# Patient Record
Sex: FEMALE | Race: WHITE | NOT HISPANIC OR LATINO | Employment: OTHER | URBAN - NONMETROPOLITAN AREA
[De-identification: names, ages, dates, MRNs, and addresses within clinical notes are randomized per-mention and may not be internally consistent; named-entity substitution may affect disease eponyms.]

---

## 2017-01-30 PROBLEM — M70.60 TROCHANTERIC BURSITIS: Status: ACTIVE | Noted: 2017-01-30

## 2017-01-30 PROBLEM — C44.90 MALIGNANT NEOPLASM OF SKIN: Status: ACTIVE | Noted: 2017-01-30

## 2017-01-30 PROBLEM — M48.061 SPINAL STENOSIS OF LUMBAR REGION: Status: ACTIVE | Noted: 2017-01-30

## 2017-01-30 PROBLEM — K21.9 GERD (GASTROESOPHAGEAL REFLUX DISEASE): Status: ACTIVE | Noted: 2017-01-30

## 2017-01-30 PROBLEM — R01.1 MURMUR: Status: ACTIVE | Noted: 2017-01-30

## 2017-01-30 PROBLEM — I25.10 CORONARY ARTERIOSCLEROSIS: Status: ACTIVE | Noted: 2017-01-30

## 2017-01-30 PROBLEM — N19 PRERENAL RENAL FAILURE: Status: ACTIVE | Noted: 2017-01-30

## 2017-01-30 PROBLEM — M48.50XA COMPRESSION FRACTURE OF VERTEBRAL COLUMN (HCC): Status: ACTIVE | Noted: 2017-01-30

## 2017-01-30 PROBLEM — S22.000A COMPRESSION FRACTURE OF THORACIC VERTEBRA (HCC): Status: ACTIVE | Noted: 2017-01-30

## 2017-01-30 PROBLEM — I50.32 CHRONIC DIASTOLIC CHF (CONGESTIVE HEART FAILURE) (HCC): Status: ACTIVE | Noted: 2017-01-30

## 2017-01-30 PROBLEM — I51.89 DIASTOLIC DYSFUNCTION: Status: ACTIVE | Noted: 2017-01-30

## 2017-01-30 PROBLEM — E78.5 HYPERLIPIDEMIA: Status: ACTIVE | Noted: 2017-01-30

## 2017-01-30 PROBLEM — R13.10 DYSPHAGIA: Status: ACTIVE | Noted: 2017-01-30

## 2017-01-30 PROBLEM — M19.90 CHRONIC OSTEOARTHRITIS: Status: ACTIVE | Noted: 2017-01-30

## 2017-01-30 PROBLEM — I10 ESSENTIAL HYPERTENSION: Status: ACTIVE | Noted: 2017-01-30

## 2017-01-30 PROBLEM — J84.10 DIFFUSE INTERSTITIAL PULMONARY FIBROSIS (HCC): Status: ACTIVE | Noted: 2017-01-30

## 2017-01-30 PROBLEM — R06.00 DYSPNEA: Status: ACTIVE | Noted: 2017-01-30

## 2017-01-30 PROBLEM — J84.10 FIBROSIS OF LUNG (HCC): Status: ACTIVE | Noted: 2017-01-30

## 2017-01-30 PROBLEM — M54.50 LOW BACK PAIN: Status: ACTIVE | Noted: 2017-01-30

## 2017-01-30 RX ORDER — PRAVASTATIN SODIUM 10 MG
10 TABLET ORAL DAILY
COMMUNITY

## 2017-01-30 RX ORDER — GABAPENTIN 400 MG/1
400 CAPSULE ORAL NIGHTLY
COMMUNITY

## 2017-01-30 RX ORDER — RANITIDINE 300 MG/1
300 TABLET ORAL 2 TIMES DAILY
COMMUNITY
End: 2021-06-24 | Stop reason: ALTCHOICE

## 2017-01-30 RX ORDER — PANTOPRAZOLE SODIUM 40 MG/1
40 TABLET, DELAYED RELEASE ORAL 2 TIMES DAILY
COMMUNITY
End: 2021-06-24 | Stop reason: ALTCHOICE

## 2017-01-30 RX ORDER — FUROSEMIDE 20 MG/1
20 TABLET ORAL 2 TIMES DAILY
COMMUNITY

## 2017-01-30 RX ORDER — MELOXICAM 15 MG/1
15 TABLET ORAL DAILY
COMMUNITY
End: 2019-01-16 | Stop reason: HOSPADM

## 2017-01-30 RX ORDER — SUCRALFATE 1 G/1
1 TABLET ORAL 4 TIMES DAILY
COMMUNITY

## 2017-01-30 RX ORDER — POTASSIUM CHLORIDE 20 MEQ/1
20 TABLET, EXTENDED RELEASE ORAL DAILY
COMMUNITY

## 2017-01-30 RX ORDER — SODIUM BICARBONATE 650 MG/1
650 TABLET ORAL 2 TIMES DAILY
COMMUNITY

## 2017-01-30 RX ORDER — TRAZODONE HYDROCHLORIDE 100 MG/1
100 TABLET ORAL NIGHTLY
COMMUNITY

## 2017-01-30 RX ORDER — CLOPIDOGREL BISULFATE 75 MG/1
75 TABLET ORAL DAILY
COMMUNITY

## 2017-01-30 RX ORDER — BETHANECHOL CHLORIDE 25 MG/1
25 TABLET ORAL 2 TIMES DAILY
COMMUNITY

## 2017-01-30 RX ORDER — DILTIAZEM HYDROCHLORIDE 120 MG/1
120 CAPSULE, COATED, EXTENDED RELEASE ORAL DAILY
Status: ON HOLD | COMMUNITY
End: 2019-01-15 | Stop reason: ALTCHOICE

## 2017-01-30 RX ORDER — NITROGLYCERIN 400 UG/1
1 SPRAY ORAL
COMMUNITY
End: 2017-04-24

## 2017-01-30 RX ORDER — KETOCONAZOLE 20 MG/ML
SHAMPOO TOPICAL 2 TIMES WEEKLY
COMMUNITY
End: 2017-04-24

## 2017-01-30 RX ORDER — LOVASTATIN 20 MG/1
20 TABLET ORAL NIGHTLY
COMMUNITY
End: 2017-01-31

## 2017-01-31 ENCOUNTER — OFFICE VISIT (OUTPATIENT)
Dept: CARDIOLOGY | Facility: CLINIC | Age: 82
End: 2017-01-31

## 2017-01-31 VITALS
HEART RATE: 56 BPM | DIASTOLIC BLOOD PRESSURE: 64 MMHG | WEIGHT: 135 LBS | SYSTOLIC BLOOD PRESSURE: 92 MMHG | BODY MASS INDEX: 24.84 KG/M2 | HEIGHT: 62 IN

## 2017-01-31 DIAGNOSIS — I25.10 CAD IN NATIVE ARTERY: Primary | ICD-10-CM

## 2017-01-31 DIAGNOSIS — I50.32 CHRONIC DIASTOLIC CHF (CONGESTIVE HEART FAILURE) (HCC): ICD-10-CM

## 2017-01-31 DIAGNOSIS — I10 ESSENTIAL HYPERTENSION: ICD-10-CM

## 2017-01-31 DIAGNOSIS — E78.2 MIXED HYPERLIPIDEMIA: ICD-10-CM

## 2017-01-31 PROCEDURE — 99203 OFFICE O/P NEW LOW 30 MIN: CPT | Performed by: INTERNAL MEDICINE

## 2017-01-31 PROCEDURE — 93000 ELECTROCARDIOGRAM COMPLETE: CPT | Performed by: INTERNAL MEDICINE

## 2017-01-31 RX ORDER — ACETAMINOPHEN 325 MG/1
325 TABLET ORAL EVERY 6 HOURS PRN
COMMUNITY

## 2017-01-31 NOTE — MR AVS SNAPSHOT
Carolina Mari   1/31/2017 12:30 PM   Office Visit    Dept Phone:  [phone]   Encounter #:  [id number]    Provider:  Walker Hunter MD   Department:  [facility]                Your Full Care Plan              Today's Medication Changes          These changes are accurate as of: 1/31/17  1:38 PM.  If you have any questions, ask your nurse or doctor.               Stop taking medication(s)listed here:     lovastatin 20 MG tablet   Commonly known as:  MEVACOR   Stopped by:  Walker Hunter MD                      Your Updated Medication List          This list is accurate as of: 1/31/17  1:38 PM.  Always use your most recent med list.                bethanechol 25 MG tablet   Commonly known as:  URECHOLINE       CALCIUM 500 + D PO       clopidogrel 75 MG tablet   Commonly known as:  PLAVIX       diltiaZEM  MG 24 hr capsule   Commonly known as:  CARDIZEM CD       furosemide 40 MG tablet   Commonly known as:  LASIX       gabapentin 300 MG capsule   Commonly known as:  NEURONTIN       ketoconazole 2 % shampoo   Commonly known as:  NIZORAL       meloxicam 15 MG tablet   Commonly known as:  MOBIC       nitroglycerin 0.4 MG/SPRAY spray   Commonly known as:  NITROLINGUAL       pantoprazole 40 MG EC tablet   Commonly known as:  PROTONIX       potassium chloride 20 MEQ CR tablet   Commonly known as:  K-DUR,KLOR-CON       pravastatin 20 MG tablet   Commonly known as:  PRAVACHOL       raNITIdine 300 MG tablet   Commonly known as:  ZANTAC       sodium bicarbonate 650 MG tablet       sucralfate 1 G tablet   Commonly known as:  CARAFATE       traZODone 100 MG tablet   Commonly known as:  DESYREL       TYLENOL 325 MG tablet   Generic drug:  acetaminophen               We Performed the Following     ECG 12 Lead       You Were Diagnosed With        Codes Comments    CAD in native artery    -  Primary ICD-10-CM: I25.10  ICD-9-CM: 414.01     Essential  hypertension     ICD-10-CM: I10  ICD-9-CM: 401.9     Mixed hyperlipidemia     ICD-10-CM: E78.2  ICD-9-CM: 272.2     Chronic diastolic CHF (congestive heart failure)     ICD-10-CM: I50.32  ICD-9-CM: 428.32, 428.0       Instructions     None    Patient Instructions History      Upcoming Appointments     Visit Type Date Time Department    NEW PATIENT 2017 12:30 PM [facility] PAD    NEW PATIENT 3/13/2017  9:15 AM [facility]    FOLLOW UP 2018 10:15 AM [facility] PAD      MyChart Signup     [facility] [employer] allows you to send messages to your doctor, view your test results, renew your prescriptions, schedule appointments, and more. To sign up, go to [employer] and click on the Sign Up Now link in the New User? box. Enter your [employer] Activation Code exactly as it appears below along with the last four digits of your Social Security Number and your Date of Birth () to complete the sign-up process. If you do not sign up before the expiration date, you must request a new code.    [employer] Activation Code: [email or web address]  Expires: 2017  1:37 PM    If you have questions, you can email [email or web address] or call [phone] to talk to our [employer] staff. Remember, "[employer]t is NOT to be used for urgent needs. For medical emergencies, dial 911.               Other Info from Your Visit           Your Appointments     Mar 13, 2017  9:15 AM CDT   New Patient with AUSTIN Lee   [facility] (--)    [address] [place] [address] [address]  [place] [address] [phone]           Bring all previous medical records and films, along with current medications and insurance information.            2018 10:15 AM CST   Follow Up with Walker Hunter MD   [facility] (--)    [address]  [place] [address] [phone]           Arrive 15 minutes prior to appointment.              Allergies      "Aspirin      Codeine      Contrast Dye      Iodine      Lisinopril      Penicillins        Reason for Visit     Congestive Heart Failure     Coronary Artery Disease     Hypertension     Hyperlipidemia           Vital Signs     Blood Pressure Pulse Height Weight Body Mass Index Smoking Status    92/64 (BP Location: Left arm, Patient Position: Sitting) 56 62\" (157.5 cm) 135 lb (61.2 kg) 24.69 kg/m2 Never Smoker      Problems and Diagnoses Noted     Heart disease due to blocked artery    Heart failure    High blood pressure    High cholesterol or triglycerides      Results     ECG 12 Lead               "
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
## 2017-04-24 ENCOUNTER — PROCEDURE VISIT (OUTPATIENT)
Dept: OTOLARYNGOLOGY | Facility: CLINIC | Age: 82
End: 2017-04-24

## 2017-04-24 ENCOUNTER — OFFICE VISIT (OUTPATIENT)
Dept: OTOLARYNGOLOGY | Facility: CLINIC | Age: 82
End: 2017-04-24

## 2017-04-24 VITALS
WEIGHT: 135 LBS | TEMPERATURE: 98.2 F | SYSTOLIC BLOOD PRESSURE: 128 MMHG | BODY MASS INDEX: 24.84 KG/M2 | HEIGHT: 62 IN | HEART RATE: 75 BPM | DIASTOLIC BLOOD PRESSURE: 85 MMHG

## 2017-04-24 DIAGNOSIS — R49.8 VOCAL FATIGUE: ICD-10-CM

## 2017-04-24 DIAGNOSIS — H93.13 TINNITUS, BILATERAL: ICD-10-CM

## 2017-04-24 DIAGNOSIS — R49.9 VOICE DISORDER: Primary | ICD-10-CM

## 2017-04-24 DIAGNOSIS — K21.9 GASTROESOPHAGEAL REFLUX DISEASE WITHOUT ESOPHAGITIS: ICD-10-CM

## 2017-04-24 DIAGNOSIS — H90.5 HEARING LOSS, SENSORINEURAL: Primary | ICD-10-CM

## 2017-04-24 PROCEDURE — 99203 OFFICE O/P NEW LOW 30 MIN: CPT | Performed by: NURSE PRACTITIONER

## 2017-04-24 PROCEDURE — 31575 DIAGNOSTIC LARYNGOSCOPY: CPT | Performed by: NURSE PRACTITIONER

## 2017-04-24 NOTE — PROGRESS NOTES
OPERATIVE NOTE:    PROCEDURE:   Flexible Fiberoptic Laryngoscopy    ANESTHESIA:  None    REASON FOR PROCEDURE:  Procedure was recommend for suspicious clinical behavior unresponsive to medical management.  Risks, benefits and alternatives were discussed.      DETAILS of OPERATION:  The patient was seated in the exam chair.  A flexible fiberoptic laryngoscopy was performed through the oral cavity.  The scope was introduced into the oral cavity and directed to the level of the glottis, examining the structures of the oropharynx, base of tongue, vallecula, supraglottic larynx, glottic larynx, and hypopharynx.      FINDINGS:  Mucosal surfaces:   The mucosal surfaces demonstrated normal mucosa surfaces without inflammation.    Base of tongue:  The base of tongue was found to have no mass or lesion.    Epiglottis:  The epiglottis was found to have no mass or lesion.    Aryepligottic fold:  The AE folds were found to have no mass or lesion.    False Vocal Fold:  The false cords were found to have no mass or lesion.    True Vocal Cord:  The true vocal cords were found to have no mass or lesion.    Arytenoid:   The arytenoids were found to have no mass or lesions.    Hypopharynx:  The hypopharynx was found to have no mass or lesion.    The patient tolerated procedure well.
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
## 2017-04-24 NOTE — PATIENT INSTRUCTIONS
(1) Due to the hearing loss and tinnitus, hearing aids were recommended. The patient did not want to pursue at this time.  (2) Tinnitus management strategies were discussed.

## 2017-04-24 NOTE — PATIENT INSTRUCTIONS
Gastroesophageal Reflux Disease, Adult  Normally, food travels down the esophagus and stays in the stomach to be digested. However, when a person has gastroesophageal reflux disease (GERD), food and stomach acid move back up into the esophagus. When this happens, the esophagus becomes sore and inflamed. Over time, GERD can create small holes (ulcers) in the lining of the esophagus.   CAUSES  This condition is caused by a problem with the muscle between the esophagus and the stomach (lower esophageal sphincter, or LES). Normally, the LES muscle closes after food passes through the esophagus to the stomach. When the LES is weakened or abnormal, it does not close properly, and that allows food and stomach acid to go back up into the esophagus. The LES can be weakened by certain dietary substances, medicines, and medical conditions, including:  · Tobacco use.  · Pregnancy.  · Having a hiatal hernia.  · Heavy alcohol use.  · Certain foods and beverages, such as coffee, chocolate, onions, and peppermint.  RISK FACTORS  This condition is more likely to develop in:  · People who have an increased body weight.  · People who have connective tissue disorders.  · People who use NSAID medicines.  SYMPTOMS  Symptoms of this condition include:  · Heartburn.  · Difficult or painful swallowing.  · The feeling of having a lump in the throat.  · A bitter taste in the mouth.  · Bad breath.  · Having a large amount of saliva.  · Having an upset or bloated stomach.  · Belching.  · Chest pain.  · Shortness of breath or wheezing.  · Ongoing (chronic) cough or a night-time cough.  · Wearing away of tooth enamel.  · Weight loss.  Different conditions can cause chest pain. Make sure to see your health care provider if you experience chest pain.  DIAGNOSIS  Your health care provider will take a medical history and perform a physical exam. To determine if you have mild or severe GERD, your health care provider may also monitor how you respond  to treatment. You may also have other tests, including:  · An endoscopy to examine your stomach and esophagus with a small camera.  · A test that measures the acidity level in your esophagus.  · A test that measures how much pressure is on your esophagus.  · A barium swallow or modified barium swallow to show the shape, size, and functioning of your esophagus.  TREATMENT  The goal of treatment is to help relieve your symptoms and to prevent complications. Treatment for this condition may vary depending on how severe your symptoms are. Your health care provider may recommend:  · Changes to your diet.  · Medicine.  · Surgery.  HOME CARE INSTRUCTIONS  Diet  · Follow a diet as recommended by your health care provider. This may involve avoiding foods and drinks such as:    Coffee and tea (with or without caffeine).    Drinks that contain alcohol.    Energy drinks and sports drinks.    Carbonated drinks or sodas.    Chocolate and cocoa.    Peppermint and mint flavorings.    Garlic and onions.    Horseradish.    Spicy and acidic foods, including peppers, chili powder, correia powder, vinegar, hot sauces, and barbecue sauce.    Citrus fruit juices and citrus fruits, such as oranges, farhana, and limes.    Tomato-based foods, such as red sauce, chili, salsa, and pizza with red sauce.    Fried and fatty foods, such as donuts, french fries, potato chips, and high-fat dressings.    High-fat meats, such as hot dogs and fatty cuts of red and white meats, such as rib eye steak, sausage, ham, and mills.    High-fat dairy items, such as whole milk, butter, and cream cheese.  · Eat small, frequent meals instead of large meals.  · Avoid drinking large amounts of liquid with your meals.  · Avoid eating meals during the 2-3 hours before bedtime.  · Avoid lying down right after you eat.  · Do not exercise right after you eat.   General Instructions   · Pay attention to any changes in your symptoms.  · Take over-the-counter and prescription  medicines only as told by your health care provider. Do not take aspirin, ibuprofen, or other NSAIDs unless your health care provider told you to do so.  · Do not use any tobacco products, including cigarettes, chewing tobacco, and e-cigarettes. If you need help quitting, ask your health care provider.  · Wear loose-fitting clothing. Do not wear anything tight around your waist that causes pressure on your abdomen.  · Raise (elevate) the head of your bed 6 inches (15cm).  · Try to reduce your stress, such as with yoga or meditation. If you need help reducing stress, ask your health care provider.  · If you are overweight, reduce your weight to an amount that is healthy for you. Ask your health care provider for guidance about a safe weight loss goal.  · Keep all follow-up visits as told by your health care provider. This is important.  SEEK MEDICAL CARE IF:  · You have new symptoms.  · You have unexplained weight loss.  · You have difficulty swallowing, or it hurts to swallow.  · You have wheezing or a persistent cough.  · Your symptoms do not improve with treatment.  · You have a hoarse voice.  SEEK IMMEDIATE MEDICAL CARE IF:  · You have pain in your arms, neck, jaw, teeth, or back.  · You feel sweaty, dizzy, or light-headed.  · You have chest pain or shortness of breath.  · You vomit and your vomit looks like blood or coffee grounds.  · You faint.  · Your stool is bloody or black.  · You cannot swallow, drink, or eat.     This information is not intended to replace advice given to you by your health care provider. Make sure you discuss any questions you have with your health care provider.     Document Released: 09/27/2006 Document Revised: 09/07/2016 Document Reviewed: 04/13/2016  [employer] Interactive Patient Education ©2016 [employer]

## 2018-11-11 ENCOUNTER — OUTSIDE FACILITY SERVICE (OUTPATIENT)
Dept: PULMONOLOGY | Facility: CLINIC | Age: 83
End: 2018-11-11

## 2018-11-11 PROCEDURE — 99232 SBSQ HOSP IP/OBS MODERATE 35: CPT | Performed by: INTERNAL MEDICINE

## 2018-11-12 ENCOUNTER — OUTSIDE FACILITY SERVICE (OUTPATIENT)
Dept: PULMONOLOGY | Facility: CLINIC | Age: 83
End: 2018-11-12

## 2018-11-12 PROCEDURE — 99232 SBSQ HOSP IP/OBS MODERATE 35: CPT | Performed by: INTERNAL MEDICINE

## 2018-11-13 ENCOUNTER — OUTSIDE FACILITY SERVICE (OUTPATIENT)
Dept: PULMONOLOGY | Facility: CLINIC | Age: 83
End: 2018-11-13

## 2018-11-13 PROCEDURE — 99232 SBSQ HOSP IP/OBS MODERATE 35: CPT | Performed by: INTERNAL MEDICINE

## 2018-11-14 ENCOUNTER — OUTSIDE FACILITY SERVICE (OUTPATIENT)
Dept: PULMONOLOGY | Facility: CLINIC | Age: 83
End: 2018-11-14

## 2018-11-14 PROCEDURE — 99231 SBSQ HOSP IP/OBS SF/LOW 25: CPT | Performed by: INTERNAL MEDICINE

## 2019-01-08 ENCOUNTER — OUTSIDE FACILITY SERVICE (OUTPATIENT)
Dept: CARDIOLOGY | Facility: CLINIC | Age: 84
End: 2019-01-08

## 2019-01-08 PROCEDURE — 93306 TTE W/DOPPLER COMPLETE: CPT | Performed by: INTERNAL MEDICINE

## 2019-01-15 ENCOUNTER — APPOINTMENT (OUTPATIENT)
Dept: GENERAL RADIOLOGY | Facility: HOSPITAL | Age: 84
End: 2019-01-15

## 2019-01-15 ENCOUNTER — APPOINTMENT (OUTPATIENT)
Dept: CT IMAGING | Facility: HOSPITAL | Age: 84
End: 2019-01-15

## 2019-01-15 ENCOUNTER — HOSPITAL ENCOUNTER (INPATIENT)
Facility: HOSPITAL | Age: 84
LOS: 1 days | Discharge: HOME HEALTH | End: 2019-01-16
Attending: EMERGENCY MEDICINE | Admitting: FAMILY MEDICINE

## 2019-01-15 DIAGNOSIS — J18.9 PNEUMONIA DUE TO INFECTIOUS ORGANISM, UNSPECIFIED LATERALITY, UNSPECIFIED PART OF LUNG: Primary | ICD-10-CM

## 2019-01-15 DIAGNOSIS — R79.89 POSITIVE D DIMER: ICD-10-CM

## 2019-01-15 DIAGNOSIS — N17.9 AKI (ACUTE KIDNEY INJURY) (HCC): ICD-10-CM

## 2019-01-15 PROBLEM — R00.1 BRADYCARDIA: Status: ACTIVE | Noted: 2019-01-15

## 2019-01-15 PROBLEM — I95.9 SYMPTOMATIC HYPOTENSION: Status: ACTIVE | Noted: 2019-01-15

## 2019-01-15 LAB
ALBUMIN SERPL-MCNC: 4.2 G/DL (ref 3.5–5)
ALBUMIN/GLOB SERPL: 1.4 G/DL (ref 1.1–2.5)
ALP SERPL-CCNC: 66 U/L (ref 24–120)
ALT SERPL W P-5'-P-CCNC: 19 U/L (ref 0–54)
ANION GAP SERPL CALCULATED.3IONS-SCNC: 11 MMOL/L (ref 4–13)
ARTERIAL PATENCY WRIST A: POSITIVE
AST SERPL-CCNC: 30 U/L (ref 7–45)
ATMOSPHERIC PRESS: 760 MMHG
BASE EXCESS BLDA CALC-SCNC: -2.9 MMOL/L (ref 0–2)
BASOPHILS # BLD AUTO: 0.07 10*3/MM3 (ref 0–0.2)
BASOPHILS NFR BLD AUTO: 1.1 % (ref 0–2)
BDY SITE: ABNORMAL
BILIRUB SERPL-MCNC: 0.4 MG/DL (ref 0.1–1)
BODY TEMPERATURE: 37 C
BUN BLD-MCNC: 39 MG/DL (ref 5–21)
BUN/CREAT SERPL: 19.8 (ref 7–25)
CALCIUM SPEC-SCNC: 9.5 MG/DL (ref 8.4–10.4)
CHLORIDE SERPL-SCNC: 107 MMOL/L (ref 98–110)
CO2 SERPL-SCNC: 24 MMOL/L (ref 24–31)
CREAT BLD-MCNC: 1.97 MG/DL (ref 0.5–1.4)
D DIMER PPP FEU-MCNC: 1.38 MG/L (FEU) (ref 0–0.5)
D-LACTATE SERPL-SCNC: 1.6 MMOL/L (ref 0.5–2)
DEPRECATED RDW RBC AUTO: 50.8 FL (ref 40–54)
EOSINOPHIL # BLD AUTO: 0.19 10*3/MM3 (ref 0–0.7)
EOSINOPHIL NFR BLD AUTO: 2.9 % (ref 0–4)
ERYTHROCYTE [DISTWIDTH] IN BLOOD BY AUTOMATED COUNT: 14.6 % (ref 12–15)
GFR SERPL CREATININE-BSD FRML MDRD: 24 ML/MIN/1.73
GLOBULIN UR ELPH-MCNC: 2.9 GM/DL
GLUCOSE BLD-MCNC: 84 MG/DL (ref 70–100)
HCO3 BLDA-SCNC: 22.2 MMOL/L (ref 20–26)
HCT VFR BLD AUTO: 33.5 % (ref 37–47)
HGB BLD-MCNC: 10.3 G/DL (ref 12–16)
IMM GRANULOCYTES # BLD AUTO: 0.03 10*3/MM3 (ref 0–0.03)
IMM GRANULOCYTES NFR BLD AUTO: 0.5 % (ref 0–5)
INR PPP: 0.9 (ref 0.91–1.09)
LYMPHOCYTES # BLD AUTO: 1.64 10*3/MM3 (ref 0.72–4.86)
LYMPHOCYTES NFR BLD AUTO: 24.7 % (ref 15–45)
Lab: ABNORMAL
MCH RBC QN AUTO: 28.9 PG (ref 28–32)
MCHC RBC AUTO-ENTMCNC: 30.7 G/DL (ref 33–36)
MCV RBC AUTO: 93.8 FL (ref 82–98)
MODALITY: ABNORMAL
MONOCYTES # BLD AUTO: 0.44 10*3/MM3 (ref 0.19–1.3)
MONOCYTES NFR BLD AUTO: 6.6 % (ref 4–12)
NEUTROPHILS # BLD AUTO: 4.27 10*3/MM3 (ref 1.87–8.4)
NEUTROPHILS NFR BLD AUTO: 64.2 % (ref 39–78)
NRBC BLD AUTO-RTO: 0 /100 WBC (ref 0–0)
NT-PROBNP SERPL-MCNC: 550 PG/ML (ref 0–1800)
PCO2 BLDA: 38.6 MM HG (ref 35–45)
PH BLDA: 7.37 PH UNITS (ref 7.35–7.45)
PLATELET # BLD AUTO: 310 10*3/MM3 (ref 130–400)
PMV BLD AUTO: 10.4 FL (ref 6–12)
PO2 BLDA: 71.6 MM HG (ref 83–108)
POTASSIUM BLD-SCNC: 5.4 MMOL/L (ref 3.5–5.3)
PROT SERPL-MCNC: 7.1 G/DL (ref 6.3–8.7)
PROTHROMBIN TIME: 12.4 SECONDS (ref 11.9–14.6)
RBC # BLD AUTO: 3.57 10*6/MM3 (ref 4.2–5.4)
SAO2 % BLDCOA: 94.1 % (ref 94–99)
SODIUM BLD-SCNC: 142 MMOL/L (ref 135–145)
TROPONIN I SERPL-MCNC: <0.012 NG/ML (ref 0–0.03)
VENTILATOR MODE: ABNORMAL
WBC NRBC COR # BLD: 6.64 10*3/MM3 (ref 4.8–10.8)

## 2019-01-15 PROCEDURE — 87040 BLOOD CULTURE FOR BACTERIA: CPT | Performed by: EMERGENCY MEDICINE

## 2019-01-15 PROCEDURE — 85610 PROTHROMBIN TIME: CPT | Performed by: EMERGENCY MEDICINE

## 2019-01-15 PROCEDURE — 25010000002 CEFTRIAXONE PER 250 MG: Performed by: NURSE PRACTITIONER

## 2019-01-15 PROCEDURE — 82803 BLOOD GASES ANY COMBINATION: CPT

## 2019-01-15 PROCEDURE — 71045 X-RAY EXAM CHEST 1 VIEW: CPT

## 2019-01-15 PROCEDURE — 83880 ASSAY OF NATRIURETIC PEPTIDE: CPT | Performed by: EMERGENCY MEDICINE

## 2019-01-15 PROCEDURE — 71046 X-RAY EXAM CHEST 2 VIEWS: CPT

## 2019-01-15 PROCEDURE — 94799 UNLISTED PULMONARY SVC/PX: CPT

## 2019-01-15 PROCEDURE — 83605 ASSAY OF LACTIC ACID: CPT | Performed by: EMERGENCY MEDICINE

## 2019-01-15 PROCEDURE — 87150 DNA/RNA AMPLIFIED PROBE: CPT | Performed by: EMERGENCY MEDICINE

## 2019-01-15 PROCEDURE — 94760 N-INVAS EAR/PLS OXIMETRY 1: CPT

## 2019-01-15 PROCEDURE — 84484 ASSAY OF TROPONIN QUANT: CPT | Performed by: EMERGENCY MEDICINE

## 2019-01-15 PROCEDURE — 93005 ELECTROCARDIOGRAM TRACING: CPT | Performed by: EMERGENCY MEDICINE

## 2019-01-15 PROCEDURE — 85379 FIBRIN DEGRADATION QUANT: CPT | Performed by: EMERGENCY MEDICINE

## 2019-01-15 PROCEDURE — 25010000002 AZITHROMYCIN PER 500 MG: Performed by: NURSE PRACTITIONER

## 2019-01-15 PROCEDURE — 80053 COMPREHEN METABOLIC PANEL: CPT | Performed by: EMERGENCY MEDICINE

## 2019-01-15 PROCEDURE — 36415 COLL VENOUS BLD VENIPUNCTURE: CPT | Performed by: EMERGENCY MEDICINE

## 2019-01-15 PROCEDURE — 85025 COMPLETE CBC W/AUTO DIFF WBC: CPT | Performed by: EMERGENCY MEDICINE

## 2019-01-15 PROCEDURE — 94640 AIRWAY INHALATION TREATMENT: CPT

## 2019-01-15 PROCEDURE — 99284 EMERGENCY DEPT VISIT MOD MDM: CPT

## 2019-01-15 PROCEDURE — 25010000002 LEVOFLOXACIN PER 250 MG: Performed by: EMERGENCY MEDICINE

## 2019-01-15 PROCEDURE — 36600 WITHDRAWAL OF ARTERIAL BLOOD: CPT

## 2019-01-15 PROCEDURE — 25010000002 ENOXAPARIN PER 10 MG: Performed by: EMERGENCY MEDICINE

## 2019-01-15 PROCEDURE — 93010 ELECTROCARDIOGRAM REPORT: CPT | Performed by: INTERNAL MEDICINE

## 2019-01-15 PROCEDURE — 25010000002 FUROSEMIDE PER 20 MG: Performed by: EMERGENCY MEDICINE

## 2019-01-15 RX ORDER — ONDANSETRON 4 MG/1
4 TABLET, FILM COATED ORAL EVERY 6 HOURS PRN
Status: DISCONTINUED | OUTPATIENT
Start: 2019-01-15 | End: 2019-01-16 | Stop reason: HOSPADM

## 2019-01-15 RX ORDER — ACETAMINOPHEN 325 MG/1
325 TABLET ORAL EVERY 6 HOURS PRN
Status: DISCONTINUED | OUTPATIENT
Start: 2019-01-15 | End: 2019-01-15 | Stop reason: SDUPTHER

## 2019-01-15 RX ORDER — SUCRALFATE 1 G/1
1 TABLET ORAL 4 TIMES DAILY
Status: DISCONTINUED | OUTPATIENT
Start: 2019-01-15 | End: 2019-01-16 | Stop reason: HOSPADM

## 2019-01-15 RX ORDER — GUAIFENESIN 600 MG/1
1200 TABLET, EXTENDED RELEASE ORAL EVERY 12 HOURS SCHEDULED
Status: DISCONTINUED | OUTPATIENT
Start: 2019-01-15 | End: 2019-01-16 | Stop reason: HOSPADM

## 2019-01-15 RX ORDER — IPRATROPIUM BROMIDE AND ALBUTEROL SULFATE 2.5; .5 MG/3ML; MG/3ML
3 SOLUTION RESPIRATORY (INHALATION)
Status: DISCONTINUED | OUTPATIENT
Start: 2019-01-15 | End: 2019-01-16 | Stop reason: HOSPADM

## 2019-01-15 RX ORDER — LEVOFLOXACIN 5 MG/ML
500 INJECTION, SOLUTION INTRAVENOUS ONCE
Status: COMPLETED | OUTPATIENT
Start: 2019-01-15 | End: 2019-01-15

## 2019-01-15 RX ORDER — ONDANSETRON 4 MG/1
4 TABLET, ORALLY DISINTEGRATING ORAL EVERY 6 HOURS PRN
Status: DISCONTINUED | OUTPATIENT
Start: 2019-01-15 | End: 2019-01-16 | Stop reason: HOSPADM

## 2019-01-15 RX ORDER — CARVEDILOL 3.12 MG/1
3.12 TABLET ORAL 2 TIMES DAILY WITH MEALS
COMMUNITY

## 2019-01-15 RX ORDER — PANTOPRAZOLE SODIUM 40 MG/1
40 TABLET, DELAYED RELEASE ORAL
Status: DISCONTINUED | OUTPATIENT
Start: 2019-01-16 | End: 2019-01-16 | Stop reason: HOSPADM

## 2019-01-15 RX ORDER — NITROGLYCERIN 0.4 MG/1
0.4 TABLET SUBLINGUAL
Status: DISCONTINUED | OUTPATIENT
Start: 2019-01-15 | End: 2019-01-16 | Stop reason: HOSPADM

## 2019-01-15 RX ORDER — ACETAMINOPHEN 325 MG/1
650 TABLET ORAL EVERY 4 HOURS PRN
Status: DISCONTINUED | OUTPATIENT
Start: 2019-01-15 | End: 2019-01-16 | Stop reason: HOSPADM

## 2019-01-15 RX ORDER — TRAZODONE HYDROCHLORIDE 100 MG/1
100 TABLET ORAL NIGHTLY
Status: DISCONTINUED | OUTPATIENT
Start: 2019-01-15 | End: 2019-01-16 | Stop reason: HOSPADM

## 2019-01-15 RX ORDER — POTASSIUM CHLORIDE 750 MG/1
20 CAPSULE, EXTENDED RELEASE ORAL DAILY
Status: DISCONTINUED | OUTPATIENT
Start: 2019-01-16 | End: 2019-01-16 | Stop reason: HOSPADM

## 2019-01-15 RX ORDER — SODIUM BICARBONATE 650 MG/1
650 TABLET ORAL 2 TIMES DAILY
Status: DISCONTINUED | OUTPATIENT
Start: 2019-01-15 | End: 2019-01-16 | Stop reason: HOSPADM

## 2019-01-15 RX ORDER — ATORVASTATIN CALCIUM 10 MG/1
10 TABLET, FILM COATED ORAL DAILY
Status: DISCONTINUED | OUTPATIENT
Start: 2019-01-16 | End: 2019-01-16 | Stop reason: HOSPADM

## 2019-01-15 RX ORDER — SODIUM CHLORIDE 9 MG/ML
75 INJECTION, SOLUTION INTRAVENOUS CONTINUOUS
Status: DISCONTINUED | OUTPATIENT
Start: 2019-01-15 | End: 2019-01-16

## 2019-01-15 RX ORDER — SODIUM CHLORIDE 0.9 % (FLUSH) 0.9 %
3 SYRINGE (ML) INJECTION EVERY 12 HOURS SCHEDULED
Status: DISCONTINUED | OUTPATIENT
Start: 2019-01-15 | End: 2019-01-16 | Stop reason: HOSPADM

## 2019-01-15 RX ORDER — CARVEDILOL 6.25 MG/1
6.25 TABLET ORAL 2 TIMES DAILY WITH MEALS
Status: DISCONTINUED | OUTPATIENT
Start: 2019-01-15 | End: 2019-01-15

## 2019-01-15 RX ORDER — SODIUM CHLORIDE 0.9 % (FLUSH) 0.9 %
3-10 SYRINGE (ML) INJECTION AS NEEDED
Status: DISCONTINUED | OUTPATIENT
Start: 2019-01-15 | End: 2019-01-16 | Stop reason: HOSPADM

## 2019-01-15 RX ORDER — GABAPENTIN 400 MG/1
400 CAPSULE ORAL NIGHTLY
Status: DISCONTINUED | OUTPATIENT
Start: 2019-01-15 | End: 2019-01-16 | Stop reason: HOSPADM

## 2019-01-15 RX ORDER — CLOPIDOGREL BISULFATE 75 MG/1
75 TABLET ORAL DAILY
Status: DISCONTINUED | OUTPATIENT
Start: 2019-01-16 | End: 2019-01-16 | Stop reason: HOSPADM

## 2019-01-15 RX ORDER — CARVEDILOL 3.12 MG/1
3.12 TABLET ORAL 2 TIMES DAILY WITH MEALS
Status: DISCONTINUED | OUTPATIENT
Start: 2019-01-16 | End: 2019-01-16 | Stop reason: HOSPADM

## 2019-01-15 RX ORDER — FAMOTIDINE 20 MG/1
40 TABLET, FILM COATED ORAL 2 TIMES DAILY
Status: DISCONTINUED | OUTPATIENT
Start: 2019-01-15 | End: 2019-01-16 | Stop reason: HOSPADM

## 2019-01-15 RX ORDER — FUROSEMIDE 10 MG/ML
40 INJECTION INTRAMUSCULAR; INTRAVENOUS ONCE
Status: COMPLETED | OUTPATIENT
Start: 2019-01-15 | End: 2019-01-15

## 2019-01-15 RX ORDER — ONDANSETRON 2 MG/ML
4 INJECTION INTRAMUSCULAR; INTRAVENOUS EVERY 6 HOURS PRN
Status: DISCONTINUED | OUTPATIENT
Start: 2019-01-15 | End: 2019-01-16 | Stop reason: HOSPADM

## 2019-01-15 RX ORDER — FUROSEMIDE 20 MG/1
20 TABLET ORAL
Status: DISCONTINUED | OUTPATIENT
Start: 2019-01-16 | End: 2019-01-15

## 2019-01-15 RX ORDER — BETHANECHOL CHLORIDE 25 MG/1
25 TABLET ORAL 2 TIMES DAILY
Status: DISCONTINUED | OUTPATIENT
Start: 2019-01-15 | End: 2019-01-16 | Stop reason: HOSPADM

## 2019-01-15 RX ADMIN — AZITHROMYCIN MONOHYDRATE 500 MG: 500 INJECTION, POWDER, LYOPHILIZED, FOR SOLUTION INTRAVENOUS at 18:09

## 2019-01-15 RX ADMIN — SODIUM BICARBONATE 650 MG: 650 TABLET ORAL at 20:34

## 2019-01-15 RX ADMIN — LEVOFLOXACIN 500 MG: 5 INJECTION, SOLUTION INTRAVENOUS at 15:06

## 2019-01-15 RX ADMIN — TRAZODONE HYDROCHLORIDE 100 MG: 100 TABLET ORAL at 20:34

## 2019-01-15 RX ADMIN — FAMOTIDINE 40 MG: 20 TABLET, FILM COATED ORAL at 20:34

## 2019-01-15 RX ADMIN — IPRATROPIUM BROMIDE AND ALBUTEROL SULFATE 3 ML: 2.5; .5 SOLUTION RESPIRATORY (INHALATION) at 22:49

## 2019-01-15 RX ADMIN — IPRATROPIUM BROMIDE AND ALBUTEROL SULFATE 3 ML: 2.5; .5 SOLUTION RESPIRATORY (INHALATION) at 18:50

## 2019-01-15 RX ADMIN — GUAIFENESIN 1200 MG: 600 TABLET, EXTENDED RELEASE ORAL at 20:30

## 2019-01-15 RX ADMIN — ACETAMINOPHEN 650 MG: 325 TABLET, FILM COATED ORAL at 20:30

## 2019-01-15 RX ADMIN — FUROSEMIDE 40 MG: 10 INJECTION, SOLUTION INTRAMUSCULAR; INTRAVENOUS at 11:25

## 2019-01-15 RX ADMIN — ENOXAPARIN SODIUM 60 MG: 60 INJECTION SUBCUTANEOUS at 16:03

## 2019-01-15 RX ADMIN — BETHANECHOL CHLORIDE 25 MG: 25 TABLET ORAL at 20:34

## 2019-01-15 RX ADMIN — GABAPENTIN 400 MG: 400 CAPSULE ORAL at 20:34

## 2019-01-15 RX ADMIN — CARVEDILOL 6.25 MG: 6.25 TABLET, FILM COATED ORAL at 20:35

## 2019-01-15 RX ADMIN — SUCRALFATE 1 G: 1 TABLET ORAL at 20:34

## 2019-01-15 RX ADMIN — SODIUM CHLORIDE 75 ML/HR: 9 INJECTION, SOLUTION INTRAVENOUS at 17:39

## 2019-01-15 RX ADMIN — CEFTRIAXONE SODIUM 1 G: 1 INJECTION, POWDER, FOR SOLUTION INTRAMUSCULAR; INTRAVENOUS at 18:09

## 2019-01-15 NOTE — ED NOTES
CALLED [facility] AND HE STATED HE WOULD RECONCILE PT MEDICATIONS     Chantelle Banks, RN  01/15/19 4856

## 2019-01-15 NOTE — ED PROVIDER NOTES
Subjective     Shortness of Breath   Severity:  Moderate  Onset quality:  Gradual  Timing:  Constant  Progression:  Worsening  Chronicity:  Recurrent  Context: not activity, not animal exposure, not emotional upset, not pollens, not URI and not weather changes    Relieved by:  Nothing  Worsened by:  Nothing  Ineffective treatments:  None tried  Associated symptoms: cough, PND and wheezing    Associated symptoms: no abdominal pain, no chest pain, no claudication, no diaphoresis, no ear pain, no fever, no headaches, no hemoptysis, no neck pain, no rash, no sore throat, no sputum production, no syncope and no vomiting    Risk factors: no recent alcohol use, no obesity and no prolonged immobilization        Review of Systems   Constitutional: Negative.  Negative for activity change, appetite change, chills, diaphoresis, fatigue and fever.   HENT: Negative for congestion, drooling, ear pain, facial swelling, hearing loss, sinus pressure and sore throat.    Eyes: Negative.  Negative for discharge.   Respiratory: Positive for cough, shortness of breath and wheezing. Negative for hemoptysis and sputum production.    Cardiovascular: Positive for PND. Negative for chest pain, claudication and syncope.   Gastrointestinal: Negative for abdominal distention, abdominal pain, blood in stool, diarrhea, nausea and vomiting.   Endocrine: Negative.  Negative for cold intolerance, heat intolerance, polydipsia, polyphagia and polyuria.   Genitourinary: Negative.  Negative for dysuria, flank pain and urgency.   Musculoskeletal: Negative.  Negative for arthralgias, back pain, myalgias, neck pain and neck stiffness.   Skin: Negative.  Negative for color change, pallor and rash.   Allergic/Immunologic: Negative.    Neurological: Negative.  Negative for dizziness, seizures, speech difficulty, weakness, numbness and headaches.   Hematological: Negative.  Negative for adenopathy.   All other systems reviewed and are negative.      Past Medical  History:   Diagnosis Date   • Allergic rhinitis    • Arthritis    • Atrial fibrillation (CMS/[facility])    • Chronic diastolic CHF (congestive heart failure) (CMS/[facility]) 1/30/2017   • Chronic osteoarthritis 1/30/2017   • Compression fracture of thoracic vertebra (CMS/[facility]) 1/30/2017   • Coronary arteriosclerosis 1/30/2017   • Diastolic dysfunction 1/30/2017   • Diffuse interstitial pulmonary fibrosis (CMS/[facility]) 1/30/2017   • Dysphagia 1/30/2017   • Dyspnea 1/30/2017   • Eustachian tube dysfunction    • Fibrosis of lung (CMS/[facility]) 1/30/2017   • GERD (gastroesophageal reflux disease) 1/30/2017   • Hearing loss    • Heart disease    • Hyperlipidemia 1/30/2017   • Hypertension    • Low back pain 1/30/2017   • Malignant neoplasm of skin 1/30/2017   • Mixed hearing loss    • Murmur 1/30/2017   • Neuropathy    • Prerenal renal failure 1/30/2017   • Sensorineural hearing loss    • Spinal stenosis of lumbar region 1/30/2017   • Subjective tinnitus    • Trochanteric bursitis 1/30/2017   • Tympanosclerosis        Allergies   Allergen Reactions   • Aspirin    • Codeine    • Contrast Dye    • Iodine    • Lisinopril    • Penicillins        Past Surgical History:   Procedure Laterality Date   • APPENDECTOMY     • CHOLECYSTECTOMY     • COLON SURGERY  05/06/2015    POLYP REMOVAL   • CORONARY ANGIOPLASTY WITH STENT PLACEMENT      STENTS X3   • HYSTERECTOMY         Family History   Problem Relation Age of Onset   • Heart attack Father    • COPD Other    • Diabetes Other    • Heart disease Other        Social History     Socioeconomic History   • Marital status:      Spouse name: Not on file   • Number of children: Not on file   • Years of education: Not on file   • Highest education level: Not on file   Tobacco Use   • Smoking status: Never Smoker   • Smokeless tobacco: Never Used   Substance and Sexual Activity   • Alcohol use: No   • Drug use: No   • Sexual activity: Defer           Objective   Physical Exam   Constitutional: She is  oriented to person, place, and time. She appears well-developed and well-nourished.   HENT:   Head: Normocephalic.   Right Ear: External ear normal.   Eyes: Conjunctivae are normal. Pupils are equal, round, and reactive to light.   Neck: Normal range of motion. Neck supple.   Cardiovascular: Normal rate, regular rhythm, normal heart sounds and intact distal pulses. PMI is not displaced. Exam reveals no decreased pulses.   No murmur heard.  Pulmonary/Chest: Effort normal. No accessory muscle usage. No tachypnea. No respiratory distress. She has no decreased breath sounds. She has no wheezes. She has rales in the right middle field, the right lower field, the left middle field and the left lower field. She exhibits no tenderness.   Abdominal: Soft. Bowel sounds are normal. There is no tenderness.   Musculoskeletal: Normal range of motion. She exhibits no edema or tenderness.   Lower extremity exam bilaterally is unremarkable.  There is no right or left calf tenderness .  There is no palpable venous cord.  No obvious difference in the size of the legs.  No pitting edema.  The dorsalis pedis and posterior tibial femoral and popliteal pulses are palpable and +2 bilaterally.  Homans sign is negative   Neurological: She is alert and oriented to person, place, and time. She has normal reflexes. No cranial nerve deficit. Coordination normal.   Skin: Skin is warm. No rash noted. No erythema.   Nursing note and vitals reviewed.      Procedures           ED Course  ED Course as of Gabriel 15 1547   Tue Gabriel 15, 2019   1540 Patient will be admitted to the hospitalist service will require a VQ scan and treatment for pneumonia  [TS]   1543 Curb 65 2.  [TS]      ED Course User Index  [TS] Jitendra Hackett MD                  [facility]      Final diagnoses:   Pneumonia due to infectious organism, unspecified laterality, unspecified part of lung   KEEGAN (acute kidney injury) (CMS/[facility])   Positive D dimer            Jitendra Hackett MD  01/15/19  1540

## 2019-01-15 NOTE — H&P
"    [facility]  HISTORY AND PHYSICAL    Date of Admission: 1/15/2019  Primary Care Physician: Shahid Myers DO    Subjective     Chief Complaint: Chest pain and shortness of breathing    History of Present Illness  Carolina Mari is a 89-year-old female with a past medical history chronic diastolic heart failure most recent echocardiogram EF greater than 55%, atrial fibrillation November, 2018 now sinus bradycardic at 45, diffuse interstitial pulmonary fibrosis, hypertension now not hypotensive, mitral valve disease, coronary artery disease with stent to left anterior descending artery; see below for full list of diagnoses.  Patient states she has been chronically short of breath since admission to [facility] in November.  She was diagnosed with pneumonia however is not improved significantly since discharge.  She states 3-4 days after discharge from that facility she returned with fluid around her heart stating they took off \"10 pounds\".  She states at that time she had orthopnea and now uses oxygen at bedtime.  She states 2 days ago she developed worsening shortness of breathing and to the point she was wearing her oxygen at home, blood pressure at home was 98/46 at last check plus left-sided chest pain described as pressure currently 5 on scale 1-10 therefore she did seek evaluation at [facility] emergency department.  ER evaluation reveals acute kidney injury with a creatinine 1.97, baseline 0.76, hyper kalemia 5.4, normocytic anemia, left lower lobe pneumonia.  Patient was given full dose Lovenox with concerns for PE however no other findings suggestive of pulmonary emboli have been noted.  Patient is admitted for further evaluation and treatment.    Review of Systems   A 10 point review of systems was completed, all negative except for those discussed in HPI    Past Medical History:   Past Medical History:   Diagnosis Date   • " Allergic rhinitis    • Arthritis    • Atrial fibrillation (CMS/[facility])    • Chronic diastolic CHF (congestive heart failure) (CMS/[facility]) 1/30/2017   • Chronic osteoarthritis 1/30/2017   • Compression fracture of thoracic vertebra (CMS/[facility]) 1/30/2017   • Coronary arteriosclerosis 1/30/2017   • Diastolic dysfunction 1/30/2017   • Diffuse interstitial pulmonary fibrosis (CMS/[facility]) 1/30/2017   • Dysphagia 1/30/2017   • Dyspnea 1/30/2017   • Eustachian tube dysfunction    • Fibrosis of lung (CMS/[facility]) 1/30/2017   • GERD (gastroesophageal reflux disease) 1/30/2017   • Hearing loss    • Heart disease    • Hyperlipidemia 1/30/2017   • Hypertension    • Low back pain 1/30/2017   • Malignant neoplasm of skin 1/30/2017   • Mixed hearing loss    • Murmur 1/30/2017   • Neuropathy    • Prerenal renal failure 1/30/2017   • Sensorineural hearing loss    • Spinal stenosis of lumbar region 1/30/2017   • Subjective tinnitus    • Trochanteric bursitis 1/30/2017   • Tympanosclerosis        Past Surgical History:   Past Surgical History:   Procedure Laterality Date   • APPENDECTOMY     • CHOLECYSTECTOMY     • COLON SURGERY  05/06/2015    POLYP REMOVAL   • CORONARY ANGIOPLASTY WITH STENT PLACEMENT      STENTS X3   • HYSTERECTOMY         Family History: family history includes COPD in her other; Diabetes in her other; Heart attack in her father; Heart disease in her other.    Social History:  reports that  has never smoked. she has never used smokeless tobacco. She reports that she does not drink alcohol or use drugs.    Code Status: Conditional, if unable speak for herself her  Mat Strickland will speak for her      Allergies:  Allergies   Allergen Reactions   • Aspirin    • Codeine    • Contrast Dye    • Iodine    • Lisinopril    • Penicillins        Medications:  Prior to Admission medications    Medication Sig Start Date End Date Taking? Authorizing Provider   acetaminophen (TYLENOL) 325 MG tablet Take 325 mg by mouth Every 6 (Six)  "Hours.    Carla Fonseca MD   bethanechol (URECHOLINE) 25 MG tablet Take 25 mg by mouth Daily.    Carla Fonseca MD   Calcium Carbonate-Vitamin D (CALCIUM 500 + D PO) Take  by mouth Daily. 2 TABLETS DAILY    Carla Fonseca MD   clopidogrel (PLAVIX) 75 MG tablet Take 75 mg by mouth Daily.    Carla Fonseca MD   diltiaZEM CD (CARDIZEM CD) 120 MG 24 hr capsule Take 120 mg by mouth Daily.    Carla Fonseca MD   furosemide (LASIX) 40 MG tablet Take 40 mg by mouth Daily.    Carla Fonseca MD   gabapentin (NEURONTIN) 300 MG capsule Take 300 mg by mouth 2 (Two) Times a Day.    Carla Fonseca MD   meloxicam (MOBIC) 15 MG tablet Take 15 mg by mouth Daily.    Carla Fonseca MD   pantoprazole (PROTONIX) 40 MG EC tablet Take 40 mg by mouth 2 (Two) Times a Day.    Carla Fonseca MD   potassium chloride (K-DUR,KLOR-CON) 20 MEQ CR tablet Take 20 mEq by mouth Daily.    Carla Fonseca MD   pravastatin (PRAVACHOL) 20 MG tablet Take 20 mg by mouth Daily.    Carla Fonseca MD   raNITIdine (ZANTAC) 300 MG tablet Take 300 mg by mouth 2 (Two) Times a Day.    Carla Fonseca MD   sodium bicarbonate 650 MG tablet Take 650 mg by mouth 2 (Two) Times a Day.    Carla Fonseca MD   sucralfate (CARAFATE) 1 G tablet Take 1 g by mouth 4 (Four) Times a Day.    Carla Fonseca MD   traZODone (DESYREL) 100 MG tablet Take 100 mg by mouth 2 (Two) Times a Day.    Carla Fonseca MD       Objective     /59   Pulse 57   Temp 98.4 °F (36.9 °C)   Resp 17   Ht 157.5 cm (62\")   Wt 58.1 kg (128 lb)   SpO2 99%   BMI 23.41 kg/m²   Physical Exam   Constitutional: She is oriented to person, place, and time. She appears well-developed and well-nourished. No distress.   HENT:   Head: Normocephalic and atraumatic.   Eyes: Conjunctivae and EOM are normal. Pupils are equal, round, and reactive to light. No scleral icterus.   Neck: Normal range of motion. " Neck supple. No JVD present. No tracheal deviation present.   Cardiovascular: Normal rate, regular rhythm, normal heart sounds and intact distal pulses. Exam reveals no gallop.   No murmur heard.  Pulmonary/Chest: Effort normal. No respiratory distress. She has no wheezes. She has rales (crackles left lower lung).   Abdominal: Soft. Bowel sounds are normal. She exhibits no distension. There is no tenderness. There is no guarding.   Musculoskeletal: Normal range of motion. She exhibits no edema.   Generalized weakness   Neurological: She is alert and oriented to person, place, and time.   No obvious deficits noted.   Skin: Skin is warm and dry. No rash noted. She is not diaphoretic. No erythema. No pallor.   Psychiatric: She has a normal mood and affect. Her behavior is normal.   Vitals reviewed.      Pertinent Data:   Lab Results (last 72 hours)     Procedure Component Value Units Date/Time    Lactic Acid, Plasma [[id number]]  (Normal) Collected:  01/15/19 1502    Specimen:  Blood Updated:  01/15/19 1532     Lactate 1.6 mmol/L     Blood Culture - Blood, Arm, Left [[id number]] Collected:  01/15/19 1502    Specimen:  Blood from Arm, Left Updated:  01/15/19 1517    Blood Culture - Blood, Arm, Left [[id number]] Collected:  01/15/19 1446    Specimen:  Blood from Arm, Left Updated:  01/15/19 1517    Comprehensive Metabolic Panel [[id number]]  (Abnormal) Collected:  01/15/19 1124    Specimen:  Blood Updated:  01/15/19 1444     Glucose 84 mg/dL      BUN 39 mg/dL      Creatinine 1.97 mg/dL      Sodium 142 mmol/L      Potassium 5.4 mmol/L      Chloride 107 mmol/L      CO2 24.0 mmol/L      Calcium 9.5 mg/dL      Total Protein 7.1 g/dL      Albumin 4.20 g/dL      ALT (SGPT) 19 U/L      AST (SGOT) 30 U/L      Alkaline Phosphatase 66 U/L      Total Bilirubin 0.4 mg/dL      eGFR Non African Amer 24 mL/min/1.73      Globulin 2.9 gm/dL      A/G Ratio 1.4 g/dL      BUN/Creatinine Ratio 19.8     Anion Gap 11.0 mmol/L     Narrative:        The MDRD GFR formula is only valid for adults with stable renal function between ages 18 and 70.    BNP [[id number]]  (Normal) Collected:  01/15/19 1124    Specimen:  Blood Updated:  01/15/19 1159     proBNP 550.0 pg/mL     Troponin [[id number]]  (Normal) Collected:  01/15/19 1124    Specimen:  Blood Updated:  01/15/19 1159     Troponin I <0.012 ng/mL     D-dimer, Quantitative [[id number]]  (Abnormal) Collected:  01/15/19 1124    Specimen:  Blood Updated:  01/15/19 1148     D-Dimer, Quantitative 1.38 mg/L (FEU)     Narrative:       Reference Range is 0-0.50 mg/L FEU. However, results <0.50 mg/L FEU tends to rule out DVT or PE. Results >0.50 mg/L FEU are not useful in predicting absence or presence of DVT or PE.    Protime-INR [[id number]]  (Abnormal) Collected:  01/15/19 1124    Specimen:  Blood Updated:  01/15/19 1148     Protime 12.4 Seconds      INR 0.90    CBC Auto Differential [[id number]]  (Abnormal) Collected:  01/15/19 1124    Specimen:  Blood Updated:  01/15/19 1137     WBC 6.64 10*3/mm3      RBC 3.57 10*6/mm3      Hemoglobin 10.3 g/dL      Hematocrit 33.5 %      MCV 93.8 fL      MCH 28.9 pg      MCHC 30.7 g/dL      RDW 14.6 %      RDW-SD 50.8 fl      MPV 10.4 fL      Platelets 310 10*3/mm3      Neutrophil % 64.2 %      Lymphocyte % 24.7 %      Monocyte % 6.6 %      Eosinophil % 2.9 %      Basophil % 1.1 %      Immature Grans % 0.5 %      Neutrophils, Absolute 4.27 10*3/mm3      Lymphocytes, Absolute 1.64 10*3/mm3      Monocytes, Absolute 0.44 10*3/mm3      Eosinophils, Absolute 0.19 10*3/mm3      Basophils, Absolute 0.07 10*3/mm3      Immature Grans, Absolute 0.03 10*3/mm3      nRBC 0.0 /100 WBC     Blood Gas, Arterial [[id number]]  (Abnormal) Collected:  01/15/19 1047    Specimen:  Arterial Blood Updated:  01/15/19 1050     Site Left Radial     Bereket's Test Positive     pH, Arterial 7.367 pH units      pCO2, Arterial 38.6 mm Hg      pO2, Arterial 71.6 mm Hg      Comment: 84 Value below reference range         HCO3, Arterial 22.2 mmol/L      Base Excess, Arterial -2.9 mmol/L      Comment: 84 Value below reference range        O2 Saturation, Arterial 94.1 %      Temperature 37.0 C      Barometric Pressure for Blood Gas 760 mmHg      Modality Room Air     Ventilator Mode NA     Collected by [id number]     Comment: Meter: [id number]     :  [id number]           Imaging Results (last 24 hours)     Procedure Component Value Units Date/Time    XR Chest 2 View [[id number]] Resulted:  01/15/19 1618     Updated:  01/15/19 1617    XR Chest 1 View [[id number]] Collected:  01/15/19 1129     Updated:  01/15/19 1133    Narrative:       XR CHEST 1 VW- 1/15/2019 11:21 AM CST     HISTORY: SOA       COMPARISON: 9/6/2016.     FINDINGS:   Multiple old bilateral rib fractures are present. The lungs are  hypoinflated. Obscuration of the LEFT hemidiaphragm may be due to  atelectasis or underlying pneumonia.      The heart is stable in appearance. Atherosclerotic calcifications are  seen. Degenerative changes are present in the shoulders and spine.       Impression:       1. Possible LEFT lower lobe airspace disease or atelectasis.        This report was finalized on 01/15/2019 11:30 by Dr. Brett Maya MD.        [facility]  Summary   Severely thickened and calcified mitral valve with moderately reduced  leaflet mobility, evidence of moderate mitral valve stenosis, moderate to  severe mitral regurgitation.   Mild tricuspid regurgitation with estimated RVSP of 31 mm Hg.   Moderately dilated left atrium.   Normal left ventricular size with preserved LV function and an estimated   ejection fraction of approximately 55-60%.   Moderate concentric left ventricular hypertrophy.   Technically difficult examination.    I have personally reviewed and interpreted the radiology studies and ECG obtained at time of admission.     Assessment / Plan     Assessment:   Active Hospital Problems    Diagnosis   • **Pneumonia  due to infectious organism   • Symptomatic hypotension   • Bradycardia   • KEEGAN (acute kidney injury) (CMS/HCC)   • Chronic diastolic CHF (congestive heart failure) (CMS/HCC)   • Diffuse interstitial pulmonary fibrosis (CMS/HCC)   • CAD in native artery       Plan:   1.  Admit as inpatient  2.  Rocephin and azithromycin  3.  Duo nebs, incentive spirometry, respiratory culture, supplemental O2 as needed, Mucinex  4.  Gentle hydration normal saline 75 ML/hour  5.  Labs in a.m. CBC with differential, CMP, A1c, lipid panel, TSH  6.  Home medications reviewed and restarted as appropriate, hold Cardizem  7.  DVT prophylaxis with SCDs and Lovenox  8.  PA/lateral chest today  9.  CT of the chest without contrast in a.m.  10.  Nitroglycerin as needed for chest pain    I discussed the patient's findings and my recommendations with: Monika Theodore DO  Time spent 40 minutes      AUSTIN Conner  01/15/19   4:18 PM       Chart reviewed  Patient examined  Agree with assessment and plan.   Discussed with patient and DIONE Theodore DO  01/15/19  5:13 PM

## 2019-01-16 ENCOUNTER — APPOINTMENT (OUTPATIENT)
Dept: CT IMAGING | Facility: HOSPITAL | Age: 84
End: 2019-01-16

## 2019-01-16 VITALS
WEIGHT: 127.6 LBS | RESPIRATION RATE: 18 BRPM | HEART RATE: 65 BPM | OXYGEN SATURATION: 99 % | SYSTOLIC BLOOD PRESSURE: 104 MMHG | BODY MASS INDEX: 23.48 KG/M2 | DIASTOLIC BLOOD PRESSURE: 50 MMHG | HEIGHT: 62 IN | TEMPERATURE: 97.4 F

## 2019-01-16 LAB
ALBUMIN SERPL-MCNC: 3.2 G/DL (ref 3.5–5)
ALBUMIN/GLOB SERPL: 1.3 G/DL (ref 1.1–2.5)
ALP SERPL-CCNC: 48 U/L (ref 24–120)
ALT SERPL W P-5'-P-CCNC: 17 U/L (ref 0–54)
ANION GAP SERPL CALCULATED.3IONS-SCNC: 9 MMOL/L (ref 4–13)
ARTICHOKE IGE QN: 91 MG/DL (ref 0–99)
AST SERPL-CCNC: 20 U/L (ref 7–45)
BACTERIA BLD CULT: ABNORMAL
BASOPHILS # BLD AUTO: 0.05 10*3/MM3 (ref 0–0.2)
BASOPHILS NFR BLD AUTO: 1.1 % (ref 0–2)
BILIRUB SERPL-MCNC: 0.3 MG/DL (ref 0.1–1)
BUN BLD-MCNC: 43 MG/DL (ref 5–21)
BUN/CREAT SERPL: 21.1 (ref 7–25)
CALCIUM SPEC-SCNC: 8.5 MG/DL (ref 8.4–10.4)
CHLORIDE SERPL-SCNC: 107 MMOL/L (ref 98–110)
CHOLEST SERPL-MCNC: 149 MG/DL (ref 130–200)
CO2 SERPL-SCNC: 25 MMOL/L (ref 24–31)
CREAT BLD-MCNC: 2.04 MG/DL (ref 0.5–1.4)
DEPRECATED RDW RBC AUTO: 49.3 FL (ref 40–54)
EOSINOPHIL # BLD AUTO: 0.17 10*3/MM3 (ref 0–0.7)
EOSINOPHIL NFR BLD AUTO: 3.6 % (ref 0–4)
ERYTHROCYTE [DISTWIDTH] IN BLOOD BY AUTOMATED COUNT: 14.8 % (ref 12–15)
GFR SERPL CREATININE-BSD FRML MDRD: 23 ML/MIN/1.73
GLOBULIN UR ELPH-MCNC: 2.5 GM/DL
GLUCOSE BLD-MCNC: 85 MG/DL (ref 70–100)
HBA1C MFR BLD: 5 %
HCT VFR BLD AUTO: 28.4 % (ref 37–47)
HDLC SERPL-MCNC: 39 MG/DL
HGB BLD-MCNC: 9 G/DL (ref 12–16)
IMM GRANULOCYTES # BLD AUTO: 0.02 10*3/MM3 (ref 0–0.03)
IMM GRANULOCYTES NFR BLD AUTO: 0.4 % (ref 0–5)
LDLC/HDLC SERPL: 2.11 {RATIO}
LYMPHOCYTES # BLD AUTO: 1.73 10*3/MM3 (ref 0.72–4.86)
LYMPHOCYTES NFR BLD AUTO: 37.1 % (ref 15–45)
MCH RBC QN AUTO: 28.9 PG (ref 28–32)
MCHC RBC AUTO-ENTMCNC: 31.7 G/DL (ref 33–36)
MCV RBC AUTO: 91.3 FL (ref 82–98)
MONOCYTES # BLD AUTO: 0.5 10*3/MM3 (ref 0.19–1.3)
MONOCYTES NFR BLD AUTO: 10.7 % (ref 4–12)
NEUTROPHILS # BLD AUTO: 2.19 10*3/MM3 (ref 1.87–8.4)
NEUTROPHILS NFR BLD AUTO: 47.1 % (ref 39–78)
NRBC BLD AUTO-RTO: 1.5 /100 WBC (ref 0–0)
PLATELET # BLD AUTO: 244 10*3/MM3 (ref 130–400)
PMV BLD AUTO: 11 FL (ref 6–12)
POTASSIUM BLD-SCNC: 5 MMOL/L (ref 3.5–5.3)
PROT SERPL-MCNC: 5.7 G/DL (ref 6.3–8.7)
RBC # BLD AUTO: 3.11 10*6/MM3 (ref 4.2–5.4)
SODIUM BLD-SCNC: 141 MMOL/L (ref 135–145)
TRIGL SERPL-MCNC: 139 MG/DL (ref 0–149)
TSH SERPL DL<=0.05 MIU/L-ACNC: 0.39 MIU/ML (ref 0.47–4.68)
WBC NRBC COR # BLD: 4.66 10*3/MM3 (ref 4.8–10.8)

## 2019-01-16 PROCEDURE — 94760 N-INVAS EAR/PLS OXIMETRY 1: CPT

## 2019-01-16 PROCEDURE — 80061 LIPID PANEL: CPT | Performed by: NURSE PRACTITIONER

## 2019-01-16 PROCEDURE — 80053 COMPREHEN METABOLIC PANEL: CPT | Performed by: NURSE PRACTITIONER

## 2019-01-16 PROCEDURE — 71250 CT THORAX DX C-: CPT

## 2019-01-16 PROCEDURE — 83036 HEMOGLOBIN GLYCOSYLATED A1C: CPT | Performed by: NURSE PRACTITIONER

## 2019-01-16 PROCEDURE — 94799 UNLISTED PULMONARY SVC/PX: CPT

## 2019-01-16 PROCEDURE — 85025 COMPLETE CBC W/AUTO DIFF WBC: CPT | Performed by: NURSE PRACTITIONER

## 2019-01-16 PROCEDURE — 84443 ASSAY THYROID STIM HORMONE: CPT | Performed by: NURSE PRACTITIONER

## 2019-01-16 RX ADMIN — BETHANECHOL CHLORIDE 25 MG: 25 TABLET ORAL at 08:12

## 2019-01-16 RX ADMIN — IPRATROPIUM BROMIDE AND ALBUTEROL SULFATE 3 ML: 2.5; .5 SOLUTION RESPIRATORY (INHALATION) at 02:42

## 2019-01-16 RX ADMIN — CLOPIDOGREL 75 MG: 75 TABLET, FILM COATED ORAL at 08:12

## 2019-01-16 RX ADMIN — ATORVASTATIN CALCIUM 10 MG: 10 TABLET, FILM COATED ORAL at 08:12

## 2019-01-16 RX ADMIN — SUCRALFATE 1 G: 1 TABLET ORAL at 08:12

## 2019-01-16 RX ADMIN — CARVEDILOL 3.12 MG: 3.12 TABLET, FILM COATED ORAL at 08:12

## 2019-01-16 RX ADMIN — FAMOTIDINE 40 MG: 20 TABLET, FILM COATED ORAL at 08:12

## 2019-01-16 RX ADMIN — IPRATROPIUM BROMIDE AND ALBUTEROL SULFATE 3 ML: 2.5; .5 SOLUTION RESPIRATORY (INHALATION) at 11:31

## 2019-01-16 RX ADMIN — SODIUM BICARBONATE 650 MG: 650 TABLET ORAL at 08:12

## 2019-01-16 RX ADMIN — SUCRALFATE 1 G: 1 TABLET ORAL at 11:20

## 2019-01-16 RX ADMIN — GUAIFENESIN 1200 MG: 600 TABLET, EXTENDED RELEASE ORAL at 08:12

## 2019-01-16 RX ADMIN — PANTOPRAZOLE SODIUM 40 MG: 40 TABLET, DELAYED RELEASE ORAL at 06:11

## 2019-01-16 RX ADMIN — IPRATROPIUM BROMIDE AND ALBUTEROL SULFATE 3 ML: 2.5; .5 SOLUTION RESPIRATORY (INHALATION) at 07:53

## 2019-01-16 RX ADMIN — SODIUM CHLORIDE 75 ML/HR: 9 INJECTION, SOLUTION INTRAVENOUS at 08:12

## 2019-01-16 RX ADMIN — POTASSIUM CHLORIDE 20 MEQ: 750 CAPSULE, EXTENDED RELEASE ORAL at 08:12

## 2019-01-16 NOTE — PLAN OF CARE
Problem: Patient Care Overview  Goal: Plan of Care Review  Outcome: Ongoing (interventions implemented as appropriate)   01/16/19 0319   Coping/Psychosocial   Plan of Care Reviewed With patient   Plan of Care Review   Progress no change   OTHER   Outcome Summary Patient c/o pain in back, prn medication given with relief. Continues to receive IVF and antibiotics. VSS. Safety maintained. Will continue to monitor.        Problem: Fall Risk (Adult)  Goal: Identify Related Risk Factors and Signs and Symptoms  Outcome: Outcome(s) achieved Date Met: 01/16/19    Goal: Absence of Fall  Outcome: Ongoing (interventions implemented as appropriate)      Problem: Pneumonia (Adult)  Goal: Signs and Symptoms of Listed Potential Problems Will be Absent, Minimized or Managed (Pneumonia)  Outcome: Ongoing (interventions implemented as appropriate)

## 2019-01-16 NOTE — PROGRESS NOTES
[facility] Medicine Services  INPATIENT PROGRESS NOTE    Patient Name: Carolina Mari  Date of Admission: 1/15/2019  Today's Date: 01/16/19  Length of Stay: 1  Primary Care Physician: Shahid Myers DO    Subjective   Chief Complaint: feels ok.   HPI   No shortness of breath  No nausea  No chest pain.        Review of Systems     All pertinent negatives and positives are as above. All other systems have been reviewed and are negative unless otherwise stated.     Objective    Temp:  [97.4 °F (36.3 °C)-98.7 °F (37.1 °C)] 97.4 °F (36.3 °C)  Heart Rate:  [48-81] 57  Resp:  [16-18] 17  BP: ()/(42-95) 104/50  Physical Exam   Constitutional: She is oriented to person, place, and time. She appears well-developed and well-nourished.   HENT:   Head: Normocephalic and atraumatic.   Eyes: Conjunctivae and EOM are normal. Pupils are equal, round, and reactive to light.   Neck: Neck supple. No JVD present.   Cardiovascular: Normal rate and regular rhythm. Exam reveals no gallop and no friction rub.   No murmur heard.  Pulmonary/Chest: Effort normal and breath sounds normal.   Abdominal: Soft. Bowel sounds are normal. There is no hepatosplenomegaly. There is no tenderness.   Musculoskeletal: Normal range of motion. She exhibits no edema.   Neurological: She is alert and oriented to person, place, and time. No cranial nerve deficit.   Skin: Skin is warm and dry.   Psychiatric: She has a normal mood and affect. Her behavior is normal.   Nursing note and vitals reviewed.          Results Review:  I have reviewed the labs, radiology results, and diagnostic studies.    Laboratory Data:   Results from last 7 days   Lab Units 01/16/19  0424 01/15/19  1124   WBC 10*3/mm3 4.66* 6.64   HEMOGLOBIN g/dL 9.0* 10.3*   HEMATOCRIT % 28.4* 33.5*   PLATELETS 10*3/mm3 244 310        Results from last 7 days   Lab Units 01/16/19  0424 01/15/19  1124   SODIUM mmol/L 141 142   POTASSIUM mmol/L 5.0 5.4*    CHLORIDE mmol/L 107 107   CO2 mmol/L 25.0 24.0   BUN mg/dL 43* 39*   CREATININE mg/dL 2.04* 1.97*   CALCIUM mg/dL 8.5 9.5   BILIRUBIN mg/dL 0.3 0.4   ALK PHOS U/L 48 66   ALT (SGPT) U/L 17 19   AST (SGOT) U/L 20 30   GLUCOSE mg/dL 85 84       Culture Data:   Blood Culture   Date Value Ref Range Status   01/15/2019 No growth at less than 24 hours  Preliminary   01/15/2019 No growth at less than 24 hours  Preliminary       Radiology Data:   Imaging Results (last 24 hours)     Procedure Component Value Units Date/Time    CT Chest Without Contrast [[id number]] Collected:  01/16/19 0729     Updated:  01/16/19 0744    Narrative:       History:  89-year-old with bacterial pneumonia.     Reference:  CT chest August 2016.      Technique:  Unenhanced CT chest performed.     For this CT exam, one or more of the following dose reduction techniques  was employed:  -automated exposure control  -mA and/or kVp adjustment for patient size  -iterative reconstruction      mGy-cm     Findings:  Multinodular goiter is seen. No lymphadenopathy. Mild atherosclerotic  calcification of the thoracic aorta without aneurysm. The heart is  enlarged. Trace pericardial fluid. Extensive coronary artery  atherosclerotic calcification and suspected LAD stent. There is an  implanted metallic device at or near the mitral valve.     Senescent changes in the lung parenchyma with scattered minimal areas of  scarring/atelectasis. There is slight chronic compressive atelectasis of  the inferior lingula related to the enlarged heart. No evidence of  pneumonia or samaria pulmonary edema. Mild bronchial wall thickening at  the bases, chronic. Calcified granuloma in the right lung. No suspicious  noncalcified nodules. No pleural effusion. No endobronchial mass.     Tiny hiatal hernia with surgical clips at the GE junction. Punctate  nonobstructing left renal stones.     Chronic mild L1 compression fracture. Scoliosis. Old bilateral rib  fractures.  Chronic degenerative changes of bilateral glenohumeral joints  appear          Impression:       No evidence of pneumonia.  This report was finalized on 01/16/2019 07:41 by Dr You Mcdonald, .    XR Chest 2 View [[id number]] Collected:  01/15/19 1618     Updated:  01/15/19 1623    Narrative:       HISTORY: Cough, pneumonia     CXR: 2 views the chest are obtained.     COMPARISON: 1/15/2019     FINDINGS: There is an improved level of inspiration. There is improving  aeration of the left lung base. The lungs are hyperinflated. There is  mild cardiomegaly. There are coronary vascular stents. Surgical clips  within the epigastric region. No pleural effusion or pneumothorax is  identified. There are old rib fractures. There is an exaggerated  kyphosis as well as dextroscoliosis of the thoracolumbar spine with  chronic anterior wedging of a lower thoracic vertebra.       Impression:       1. Hyperinflated lungs indicating COPD. Improved aeration of the left  lung base compared to the portable chest x-ray performed earlier today.  No convincing pneumonia.  2. Stable cardiomegaly. Coronary vascular stents.  This report was finalized on 01/15/2019 16:20 by Dr. Marjorie Guerrero MD.    XR Chest 1 View [[id number]] Collected:  01/15/19 1129     Updated:  01/15/19 1133    Narrative:       XR CHEST 1 VW- 1/15/2019 11:21 AM CST     HISTORY: SOA       COMPARISON: 9/6/2016.     FINDINGS:   Multiple old bilateral rib fractures are present. The lungs are  hypoinflated. Obscuration of the LEFT hemidiaphragm may be due to  atelectasis or underlying pneumonia.      The heart is stable in appearance. Atherosclerotic calcifications are  seen. Degenerative changes are present in the shoulders and spine.       Impression:       1. Possible LEFT lower lobe airspace disease or atelectasis.        This report was finalized on 01/15/2019 11:30 by Dr. Brett Maya MD.          I have reviewed the patient's current medications.     Assessment/Plan      Active Hospital Problems    Diagnosis   • **Pneumonia due to infectious organism   • Symptomatic hypotension   • Bradycardia   • KEEGAN (acute kidney injury) (CMS/[facility])   • Chronic diastolic CHF (congestive heart failure) (CMS/[facility])   • Diffuse interstitial pulmonary fibrosis (CMS/[facility])   • CAD in native artery     Assessment    Symptomatic hypotension  Bradycardia  KEEGAN  Chronic  diastolic CHF  Hx of diffuse interstitial pulmonary fibrosis    Plan    DC antibiotics  DC fluids  No NSAIDS  Discussed use of IS routinely, even at home.   Discussed need for adequate oral intake.  Slight bump in creatinine likely related to levaquin.   Will mobilize.  IF BP and pulse are stable then may be able to go home this afternoon                 Discharge Planning: I expect the patient to be discharged to home in 0-1 days.    Monika Theodore DO   01/16/19   9:41 AM

## 2019-01-16 NOTE — DISCHARGE PLACEMENT REQUEST
"Ruth Ann Bryant [phone]  JaceyRenaldo lianezda (89 y.o. Female)     Date of Birth Social Security Number Address Home Phone MRN    10/20/1928  [address]  [place] [address] [phone] [id number]    Orthodox Marital Status          Confucianism        Admission Date Admission Type Admitting Provider Attending Provider Department, Room/Bed    1/15/19 Emergency Monika Theodore DO  [facility] 4C, 492/1    Discharge Date Discharge Disposition Discharge Destination        1/16/2019 Home or Self Care              Attending Provider:  (none)   Allergies:  Aspirin, Codeine, Contrast Dye, Iodine, Lisinopril, Penicillins    Isolation:  None   Infection:  None   Code Status:  No CPR    Ht:  157.5 cm (62\")   Wt:  57.9 kg (127 lb 9.6 oz)    Admission Cmt:  None   Principal Problem:  Pneumonia due to infectious organism [J18.9]                 Active Insurance as of 1/15/2019     Primary Coverage     Payor Plan Insurance Group Employer/Plan Group    MEDICARE MEDICARE A & B      Payor Plan Address Payor Plan Phone Number Payor Plan Fax Number Effective Dates    [address] [phone]  10/1/1993 - None Entered    [facility] [id number]       Subscriber Name Subscriber Birth Date Member ID       GRANT AYOUB 10/20/1928 [id number]           Secondary Coverage     Payor Plan Insurance Group Employer/Plan Group    [facility]      Payor Plan Address Payor Plan Phone Number Payor Plan Fax Number Effective Dates    [address] [phone]  10/24/2000 - None Entered    [facility] [id number]       Subscriber Name Subscriber Birth Date Member ID       GRANT AYOUB 10/20/1928 [id number]                 Emergency Contacts      (Rel.) Home Phone Work Phone Mobile Phone    Mat Strickland (Spouse) [phone] -- --    Boby Ayoub (Son) [phone] -- --               Discharge Summary      Monika Theodore DO at 1/16/2019  1:49 PM                [facility] Team " [facility] Medicine Services  DISCHARGE SUMMARY       Date of Admission: 1/15/2019  Date of Discharge:  1/16/2019  Primary Care Physician: Shahid Myers DO    Presenting Problem/History of Present Illness:  Chest pain and shortness of breath    Final Discharge Diagnoses:  Symptomatic hypotension  Bradycardia  KEEGAN question dehydration  Chronic  diastolic CHF  Hx of diffuse interstitial pulmonary fibrosis    Consults: none    Procedures Performed: none    Pertinent Test Results:   Imaging Results (last 7 days)     Procedure Component Value Units Date/Time    CT Chest Without Contrast [[id number]] Collected:  01/16/19 0729     Updated:  01/16/19 0744    Narrative:       History:  89-year-old with bacterial pneumonia.     Reference:  CT chest August 2016.      Technique:  Unenhanced CT chest performed.     For this CT exam, one or more of the following dose reduction techniques  was employed:  -automated exposure control  -mA and/or kVp adjustment for patient size  -iterative reconstruction      mGy-cm     Findings:  Multinodular goiter is seen. No lymphadenopathy. Mild atherosclerotic  calcification of the thoracic aorta without aneurysm. The heart is  enlarged. Trace pericardial fluid. Extensive coronary artery  atherosclerotic calcification and suspected LAD stent. There is an  implanted metallic device at or near the mitral valve.     Senescent changes in the lung parenchyma with scattered minimal areas of  scarring/atelectasis. There is slight chronic compressive atelectasis of  the inferior lingula related to the enlarged heart. No evidence of  pneumonia or samaria pulmonary edema. Mild bronchial wall thickening at  the bases, chronic. Calcified granuloma in the right lung. No suspicious  noncalcified nodules. No pleural effusion. No endobronchial mass.     Tiny hiatal hernia with surgical clips at the GE junction. Punctate  nonobstructing left renal stones.     Chronic mild L1 compression  fracture. Scoliosis. Old bilateral rib  fractures. Chronic degenerative changes of bilateral glenohumeral joints  appear          Impression:       No evidence of pneumonia.  This report was finalized on 01/16/2019 07:41 by Dr You Mcdonald, .    XR Chest 2 View [[id number]] Collected:  01/15/19 1618     Updated:  01/15/19 1623    Narrative:       HISTORY: Cough, pneumonia     CXR: 2 views the chest are obtained.     COMPARISON: 1/15/2019     FINDINGS: There is an improved level of inspiration. There is improving  aeration of the left lung base. The lungs are hyperinflated. There is  mild cardiomegaly. There are coronary vascular stents. Surgical clips  within the epigastric region. No pleural effusion or pneumothorax is  identified. There are old rib fractures. There is an exaggerated  kyphosis as well as dextroscoliosis of the thoracolumbar spine with  chronic anterior wedging of a lower thoracic vertebra.       Impression:       1. Hyperinflated lungs indicating COPD. Improved aeration of the left  lung base compared to the portable chest x-ray performed earlier today.  No convincing pneumonia.  2. Stable cardiomegaly. Coronary vascular stents.  This report was finalized on 01/15/2019 16:20 by Dr. Marjorie Guerrero MD.    XR Chest 1 View [[id number]] Collected:  01/15/19 1129     Updated:  01/15/19 1133    Narrative:       XR CHEST 1 VW- 1/15/2019 11:21 AM CST     HISTORY: SOA       COMPARISON: 9/6/2016.     FINDINGS:   Multiple old bilateral rib fractures are present. The lungs are  hypoinflated. Obscuration of the LEFT hemidiaphragm may be due to  atelectasis or underlying pneumonia.      The heart is stable in appearance. Atherosclerotic calcifications are  seen. Degenerative changes are present in the shoulders and spine.       Impression:       1. Possible LEFT lower lobe airspace disease or atelectasis.        This report was finalized on 01/15/2019 11:30 by Dr. Brett Maya MD.        Lab Results (last 7 days)      Procedure Component Value Units Date/Time    Hemoglobin A1c [[id number]] Collected:  01/16/19 0424    Specimen:  Blood Updated:  01/16/19 0615     Hemoglobin A1C 5.0 %     Narrative:       Less than 6.0           Non-Diabetic Range  6.0-7.0                 ADA Therapeutic Target  Greater than 7.0        Action Suggested    TSH [[id number]]  (Abnormal) Collected:  01/16/19 0424    Specimen:  Blood Updated:  01/16/19 0607     TSH 0.388 mIU/mL     Lipid Panel [[id number]]  (Abnormal) Collected:  01/16/19 0424    Specimen:  Blood Updated:  01/16/19 0554     Total Cholesterol 149 mg/dL      Triglycerides 139 mg/dL      HDL Cholesterol 39 mg/dL      LDL Cholesterol  91 mg/dL      LDL/HDL Ratio 2.11    Comprehensive Metabolic Panel [[id number]]  (Abnormal) Collected:  01/16/19 0424    Specimen:  Blood Updated:  01/16/19 0543     Glucose 85 mg/dL      BUN 43 mg/dL      Creatinine 2.04 mg/dL      Sodium 141 mmol/L      Potassium 5.0 mmol/L      Chloride 107 mmol/L      CO2 25.0 mmol/L      Calcium 8.5 mg/dL      Total Protein 5.7 g/dL      Albumin 3.20 g/dL      ALT (SGPT) 17 U/L      AST (SGOT) 20 U/L      Alkaline Phosphatase 48 U/L      Total Bilirubin 0.3 mg/dL      eGFR Non African Amer 23 mL/min/1.73      Globulin 2.5 gm/dL      A/G Ratio 1.3 g/dL      BUN/Creatinine Ratio 21.1     Anion Gap 9.0 mmol/L     Narrative:       The MDRD GFR formula is only valid for adults with stable renal function between ages 18 and 70.    CBC Auto Differential [[id number]]  (Abnormal) Collected:  01/16/19 0424    Specimen:  Blood Updated:  01/16/19 0521     WBC 4.66 10*3/mm3      RBC 3.11 10*6/mm3      Hemoglobin 9.0 g/dL      Hematocrit 28.4 %      MCV 91.3 fL      MCH 28.9 pg      MCHC 31.7 g/dL      RDW 14.8 %      RDW-SD 49.3 fl      MPV 11.0 fL      Platelets 244 10*3/mm3      Neutrophil % 47.1 %      Lymphocyte % 37.1 %      Monocyte % 10.7 %      Eosinophil % 3.6 %      Basophil % 1.1 %      Immature Grans % 0.4 %      Neutrophils,  Absolute 2.19 10*3/mm3      Lymphocytes, Absolute 1.73 10*3/mm3      Monocytes, Absolute 0.50 10*3/mm3      Eosinophils, Absolute 0.17 10*3/mm3      Basophils, Absolute 0.05 10*3/mm3      Immature Grans, Absolute 0.02 10*3/mm3      nRBC 1.5 /100 WBC     Blood Culture - Blood, Arm, Left [[id number]] Collected:  01/15/19 1502    Specimen:  Blood from Arm, Left Updated:  01/16/19 0330     Blood Culture No growth at less than 24 hours    Blood Culture - Blood, Arm, Left [[id number]] Collected:  01/15/19 1446    Specimen:  Blood from Arm, Left Updated:  01/16/19 0330     Blood Culture No growth at less than 24 hours    Lactic Acid, Plasma [[id number]]  (Normal) Collected:  01/15/19 1502    Specimen:  Blood Updated:  01/15/19 1532     Lactate 1.6 mmol/L     Comprehensive Metabolic Panel [[id number]]  (Abnormal) Collected:  01/15/19 1124    Specimen:  Blood Updated:  01/15/19 1444     Glucose 84 mg/dL      BUN 39 mg/dL      Creatinine 1.97 mg/dL      Sodium 142 mmol/L      Potassium 5.4 mmol/L      Chloride 107 mmol/L      CO2 24.0 mmol/L      Calcium 9.5 mg/dL      Total Protein 7.1 g/dL      Albumin 4.20 g/dL      ALT (SGPT) 19 U/L      AST (SGOT) 30 U/L      Alkaline Phosphatase 66 U/L      Total Bilirubin 0.4 mg/dL      eGFR Non African Amer 24 mL/min/1.73      Globulin 2.9 gm/dL      A/G Ratio 1.4 g/dL      BUN/Creatinine Ratio 19.8     Anion Gap 11.0 mmol/L     Narrative:       The MDRD GFR formula is only valid for adults with stable renal function between ages 18 and 70.    BNP [[id number]]  (Normal) Collected:  01/15/19 1124    Specimen:  Blood Updated:  01/15/19 1159     proBNP 550.0 pg/mL     Troponin [[id number]]  (Normal) Collected:  01/15/19 1124    Specimen:  Blood Updated:  01/15/19 1159     Troponin I <0.012 ng/mL     D-dimer, Quantitative [[id number]]  (Abnormal) Collected:  01/15/19 1124    Specimen:  Blood Updated:  01/15/19 1148     D-Dimer, Quantitative 1.38 mg/L (FEU)     Narrative:       Reference Range is  0-0.50 mg/L FEU. However, results <0.50 mg/L FEU tends to rule out DVT or PE. Results >0.50 mg/L FEU are not useful in predicting absence or presence of DVT or PE.    Protime-INR [[id number]]  (Abnormal) Collected:  01/15/19 1124    Specimen:  Blood Updated:  01/15/19 1148     Protime 12.4 Seconds      INR 0.90    CBC & Differential [[id number]] Collected:  01/15/19 1124    Specimen:  Blood Updated:  01/15/19 1137    Narrative:       The following orders were created for panel order CBC & Differential.  Procedure                               Abnormality         Status                     ---------                               -----------         ------                     CBC Auto Differential[[id number]]        Abnormal            Final result                 Please view results for these tests on the individual orders.    CBC Auto Differential [[id number]]  (Abnormal) Collected:  01/15/19 1124    Specimen:  Blood Updated:  01/15/19 1137     WBC 6.64 10*3/mm3      RBC 3.57 10*6/mm3      Hemoglobin 10.3 g/dL      Hematocrit 33.5 %      MCV 93.8 fL      MCH 28.9 pg      MCHC 30.7 g/dL      RDW 14.6 %      RDW-SD 50.8 fl      MPV 10.4 fL      Platelets 310 10*3/mm3      Neutrophil % 64.2 %      Lymphocyte % 24.7 %      Monocyte % 6.6 %      Eosinophil % 2.9 %      Basophil % 1.1 %      Immature Grans % 0.5 %      Neutrophils, Absolute 4.27 10*3/mm3      Lymphocytes, Absolute 1.64 10*3/mm3      Monocytes, Absolute 0.44 10*3/mm3      Eosinophils, Absolute 0.19 10*3/mm3      Basophils, Absolute 0.07 10*3/mm3      Immature Grans, Absolute 0.03 10*3/mm3      nRBC 0.0 /100 WBC     Blood Gas, Arterial [[id number]]  (Abnormal) Collected:  01/15/19 1047    Specimen:  Arterial Blood Updated:  01/15/19 1050     Site Left Radial     Bereket's Test Positive     pH, Arterial 7.367 pH units      pCO2, Arterial 38.6 mm Hg      pO2, Arterial 71.6 mm Hg      Comment: 84 Value below reference range        HCO3, Arterial 22.2 mmol/L      Base  "Excess, Arterial -2.9 mmol/L      Comment: 84 Value below reference range        O2 Saturation, Arterial 94.1 %      Temperature 37.0 C      Barometric Pressure for Blood Gas 760 mmHg      Modality Room Air     Ventilator Mode NA     Collected by [id number]     Comment: Meter: [id number]     :  [id number]           BC positive for gram positive one bottle likely contaminate   Hospital Course:  The patient is a 89 y.o. female who presented to [facility] with generalzied weakness.   Chest pain and some shortness of air.  She was seen in ER and felt to have pneumonia.  Given IV levaquin.  Per family she had similar diagnosis and was at [facility] recently.  A CT of chest was obtained to further evaluate this.  She did not have any infiltrate on chest CT.  This AM feels better after fluids.  Has been out of bed.   Able to ambulate.   Feels like going home.     Physical Exam on Discharge:  /50 (BP Location: Right arm, Patient Position: Lying)   Pulse 65   Temp 97.4 °F (36.3 °C) (Temporal)   Resp 18   Ht 157.5 cm (62\")   Wt 57.9 kg (127 lb 9.6 oz)   SpO2 99%   BMI 23.34 kg/m²    Physical Exam   Constitutional: She is oriented to person, place, and time. She appears well-developed and well-nourished.   HENT:   Head: Normocephalic and atraumatic.   Eyes: Conjunctivae and EOM are normal. Pupils are equal, round, and reactive to light.   Neck: Neck supple.   Cardiovascular: Normal rate and regular rhythm. Exam reveals no gallop and no friction rub.   No murmur heard.  Pulmonary/Chest: Effort normal and breath sounds normal.   Abdominal: Soft. Bowel sounds are normal. There is no hepatosplenomegaly. There is no tenderness.   Musculoskeletal: Normal range of motion. She exhibits no edema.   Neurological: She is alert and oriented to person, place, and time. No cranial nerve deficit.   Skin: Skin is warm and dry.   Psychiatric: She has a normal mood and affect. Her behavior is normal.   Nursing note " and vitals reviewed.        Condition on Discharge: stable.     Discharge Disposition:  Home or Self Care    Discharge Medications:     Discharge Medications      Continue These Medications      Instructions Start Date   bethanechol 25 MG tablet  Commonly known as:  URECHOLINE   25 mg, Oral, 2 Times Daily      CALCIUM 500 + D PO   1 tablet, Oral, Daily      carvedilol 6.25 MG tablet  Commonly known as:  COREG   6.25 mg, Oral, 2 Times Daily With Meals      clopidogrel 75 MG tablet  Commonly known as:  PLAVIX   75 mg, Oral, Daily      furosemide 40 MG tablet  Commonly known as:  LASIX   20 mg, Oral, 2 Times Daily      gabapentin 400 MG capsule  Commonly known as:  NEURONTIN   400 mg, Oral, Nightly      pantoprazole 40 MG EC tablet  Commonly known as:  PROTONIX   40 mg, Oral, 2 Times Daily      potassium chloride 20 MEQ CR tablet  Commonly known as:  K-DUR,KLOR-CON   20 mEq, Oral, Daily      pravastatin 20 MG tablet  Commonly known as:  PRAVACHOL   20 mg, Oral, Daily      raNITIdine 300 MG tablet  Commonly known as:  ZANTAC   300 mg, Oral, 2 Times Daily      sodium bicarbonate 650 MG tablet   650 mg, Oral, 2 Times Daily      sucralfate 1 g tablet  Commonly known as:  CARAFATE   1 g, Oral, 4 Times Daily      traZODone 100 MG tablet  Commonly known as:  DESYREL   100 mg, Oral, Nightly      TYLENOL 325 MG tablet  Generic drug:  acetaminophen   325 mg, Oral, Every 6 Hours PRN         Stop These Medications    meloxicam 15 MG tablet  Commonly known as:  MOBIC          Discharge Diet:   Diet Instructions     Diet: Regular, Consistent Carbohydrate      Discharge Diet:   Regular  Consistent Carbohydrate           Activity at Discharge:   Activity Instructions     Activity as Tolerated            Follow-up Appointments:   PCP 5-7 days    Test Results Pending at Discharge: none    Monika Theodore DO  01/16/19  1:49 PM    Time: 35 minutes.      Electronically signed by Monika Theodore DO at 1/16/2019  2:13 PM

## 2019-01-16 NOTE — PROGRESS NOTES
Continued Stay Note   [place]     Patient Name: Carolina Mari  MRN: [id number]  Today's Date: 1/16/2019    Admit Date: 1/15/2019    Discharge Plan     Row Name 01/16/19 1523       Plan    Plan  [facility]    Patient/Family in Agreement with Plan  yes    Final Discharge Disposition Code  06 - home with home health care    Final Note  Pt is actually followed by [facility] care at home. Called and informed Deysi there of patients d/c home today [phone].    Row Name 01/16/19 143       Plan    Final Discharge Disposition Code  01 - home or self-care        Discharge Codes    No documentation.       Expected Discharge Date and Time     Expected Discharge Date Expected Discharge Time    Jan 16, 2019             KELSI Iqbal

## 2019-01-16 NOTE — PROGRESS NOTES
Discharge Planning Assessment  [facility]     Patient Name: Carolina Mari  MRN: [id number]  Today's Date: 1/16/2019    Admit Date: 1/15/2019    Discharge Needs Assessment     Row Name 01/16/19 0935       Living Environment    Lives With  spouse    Current Living Arrangements  home/apartment/condo    Primary Care Provided by  spouse/significant other    Provides Primary Care For  no one    Family Caregiver if Needed  spouse    Quality of Family Relationships  helpful;involved;supportive    Able to Return to Prior Arrangements  yes       Resource/Environmental Concerns    Resource/Environmental Concerns  none    Transportation Concerns  car, none       Transition Planning    Patient/Family Anticipates Transition to  home with family    Patient/Family Anticipated Services at Transition  home health care [facility]    Transportation Anticipated  family or friend will provide       Discharge Needs Assessment    Readmission Within the Last 30 Days  no previous admission in last 30 days    Concerns to be Addressed  no discharge needs identified    Equipment Currently Used at Home  cane, straight;walker, standard;shower chair;commode;oxygen OXYGEN [facility] [place]    Discharge Coordination/Progress  LIVES WITH SPOUSE; HAS RX COVERAGE OXYGEN FROM [facility] [place] AND [facility]; DENIES NEED FOR PURCHASE [facility] PROGRAM        Discharge Plan    No documentation.       Destination      No service coordination in this encounter.      Durable Medical Equipment      No service coordination in this encounter.      Dialysis/Infusion      No service coordination in this encounter.      Home Medical Care      No service coordination in this encounter.      Community Resources      No service coordination in this encounter.          Demographic Summary    No documentation.       Functional Status    No documentation.       Psychosocial    No documentation.       Abuse/Neglect    No documentation.        Legal    No documentation.       Substance Abuse    No documentation.       Patient Forms    No documentation.           Carla Traylor RN

## 2019-01-16 NOTE — DISCHARGE SUMMARY
[facility] Medicine Services  DISCHARGE SUMMARY       Date of Admission: 1/15/2019  Date of Discharge:  1/16/2019  Primary Care Physician: Shahid Myers DO    Presenting Problem/History of Present Illness:  Chest pain and shortness of breath    Final Discharge Diagnoses:  Symptomatic hypotension  Bradycardia  KEEGAN question dehydration  Chronic  diastolic CHF  Hx of diffuse interstitial pulmonary fibrosis    Consults: none    Procedures Performed: none    Pertinent Test Results:   Imaging Results (last 7 days)     Procedure Component Value Units Date/Time    CT Chest Without Contrast [[id number]] Collected:  01/16/19 0729     Updated:  01/16/19 0744    Narrative:       History:  89-year-old with bacterial pneumonia.     Reference:  CT chest August 2016.      Technique:  Unenhanced CT chest performed.     For this CT exam, one or more of the following dose reduction techniques  was employed:  -automated exposure control  -mA and/or kVp adjustment for patient size  -iterative reconstruction      mGy-cm     Findings:  Multinodular goiter is seen. No lymphadenopathy. Mild atherosclerotic  calcification of the thoracic aorta without aneurysm. The heart is  enlarged. Trace pericardial fluid. Extensive coronary artery  atherosclerotic calcification and suspected LAD stent. There is an  implanted metallic device at or near the mitral valve.     Senescent changes in the lung parenchyma with scattered minimal areas of  scarring/atelectasis. There is slight chronic compressive atelectasis of  the inferior lingula related to the enlarged heart. No evidence of  pneumonia or samaria pulmonary edema. Mild bronchial wall thickening at  the bases, chronic. Calcified granuloma in the right lung. No suspicious  noncalcified nodules. No pleural effusion. No endobronchial mass.     Tiny hiatal hernia with surgical clips at the GE junction. Punctate  nonobstructing left renal stones.      Chronic mild L1 compression fracture. Scoliosis. Old bilateral rib  fractures. Chronic degenerative changes of bilateral glenohumeral joints  appear          Impression:       No evidence of pneumonia.  This report was finalized on 01/16/2019 07:41 by Dr You Mcdonald, .    XR Chest 2 View [[id number]] Collected:  01/15/19 1618     Updated:  01/15/19 1623    Narrative:       HISTORY: Cough, pneumonia     CXR: 2 views the chest are obtained.     COMPARISON: 1/15/2019     FINDINGS: There is an improved level of inspiration. There is improving  aeration of the left lung base. The lungs are hyperinflated. There is  mild cardiomegaly. There are coronary vascular stents. Surgical clips  within the epigastric region. No pleural effusion or pneumothorax is  identified. There are old rib fractures. There is an exaggerated  kyphosis as well as dextroscoliosis of the thoracolumbar spine with  chronic anterior wedging of a lower thoracic vertebra.       Impression:       1. Hyperinflated lungs indicating COPD. Improved aeration of the left  lung base compared to the portable chest x-ray performed earlier today.  No convincing pneumonia.  2. Stable cardiomegaly. Coronary vascular stents.  This report was finalized on 01/15/2019 16:20 by Dr. Marjorie Guerrero MD.    XR Chest 1 View [[id number]] Collected:  01/15/19 1129     Updated:  01/15/19 1133    Narrative:       XR CHEST 1 VW- 1/15/2019 11:21 AM CST     HISTORY: SOA       COMPARISON: 9/6/2016.     FINDINGS:   Multiple old bilateral rib fractures are present. The lungs are  hypoinflated. Obscuration of the LEFT hemidiaphragm may be due to  atelectasis or underlying pneumonia.      The heart is stable in appearance. Atherosclerotic calcifications are  seen. Degenerative changes are present in the shoulders and spine.       Impression:       1. Possible LEFT lower lobe airspace disease or atelectasis.        This report was finalized on 01/15/2019 11:30 by Dr. Brett Maya MD.         Lab Results (last 7 days)     Procedure Component Value Units Date/Time    Hemoglobin A1c [[id number]] Collected:  01/16/19 0424    Specimen:  Blood Updated:  01/16/19 0615     Hemoglobin A1C 5.0 %     Narrative:       Less than 6.0           Non-Diabetic Range  6.0-7.0                 ADA Therapeutic Target  Greater than 7.0        Action Suggested    TSH [[id number]]  (Abnormal) Collected:  01/16/19 0424    Specimen:  Blood Updated:  01/16/19 0607     TSH 0.388 mIU/mL     Lipid Panel [[id number]]  (Abnormal) Collected:  01/16/19 0424    Specimen:  Blood Updated:  01/16/19 0554     Total Cholesterol 149 mg/dL      Triglycerides 139 mg/dL      HDL Cholesterol 39 mg/dL      LDL Cholesterol  91 mg/dL      LDL/HDL Ratio 2.11    Comprehensive Metabolic Panel [[id number]]  (Abnormal) Collected:  01/16/19 0424    Specimen:  Blood Updated:  01/16/19 0543     Glucose 85 mg/dL      BUN 43 mg/dL      Creatinine 2.04 mg/dL      Sodium 141 mmol/L      Potassium 5.0 mmol/L      Chloride 107 mmol/L      CO2 25.0 mmol/L      Calcium 8.5 mg/dL      Total Protein 5.7 g/dL      Albumin 3.20 g/dL      ALT (SGPT) 17 U/L      AST (SGOT) 20 U/L      Alkaline Phosphatase 48 U/L      Total Bilirubin 0.3 mg/dL      eGFR Non African Amer 23 mL/min/1.73      Globulin 2.5 gm/dL      A/G Ratio 1.3 g/dL      BUN/Creatinine Ratio 21.1     Anion Gap 9.0 mmol/L     Narrative:       The MDRD GFR formula is only valid for adults with stable renal function between ages 18 and 70.    CBC Auto Differential [[id number]]  (Abnormal) Collected:  01/16/19 0424    Specimen:  Blood Updated:  01/16/19 0521     WBC 4.66 10*3/mm3      RBC 3.11 10*6/mm3      Hemoglobin 9.0 g/dL      Hematocrit 28.4 %      MCV 91.3 fL      MCH 28.9 pg      MCHC 31.7 g/dL      RDW 14.8 %      RDW-SD 49.3 fl      MPV 11.0 fL      Platelets 244 10*3/mm3      Neutrophil % 47.1 %      Lymphocyte % 37.1 %      Monocyte % 10.7 %      Eosinophil % 3.6 %      Basophil % 1.1 %      Immature  Grans % 0.4 %      Neutrophils, Absolute 2.19 10*3/mm3      Lymphocytes, Absolute 1.73 10*3/mm3      Monocytes, Absolute 0.50 10*3/mm3      Eosinophils, Absolute 0.17 10*3/mm3      Basophils, Absolute 0.05 10*3/mm3      Immature Grans, Absolute 0.02 10*3/mm3      nRBC 1.5 /100 WBC     Blood Culture - Blood, Arm, Left [[id number]] Collected:  01/15/19 1502    Specimen:  Blood from Arm, Left Updated:  01/16/19 0330     Blood Culture No growth at less than 24 hours    Blood Culture - Blood, Arm, Left [[id number]] Collected:  01/15/19 1446    Specimen:  Blood from Arm, Left Updated:  01/16/19 0330     Blood Culture No growth at less than 24 hours    Lactic Acid, Plasma [[id number]]  (Normal) Collected:  01/15/19 1502    Specimen:  Blood Updated:  01/15/19 1532     Lactate 1.6 mmol/L     Comprehensive Metabolic Panel [[id number]]  (Abnormal) Collected:  01/15/19 1124    Specimen:  Blood Updated:  01/15/19 1444     Glucose 84 mg/dL      BUN 39 mg/dL      Creatinine 1.97 mg/dL      Sodium 142 mmol/L      Potassium 5.4 mmol/L      Chloride 107 mmol/L      CO2 24.0 mmol/L      Calcium 9.5 mg/dL      Total Protein 7.1 g/dL      Albumin 4.20 g/dL      ALT (SGPT) 19 U/L      AST (SGOT) 30 U/L      Alkaline Phosphatase 66 U/L      Total Bilirubin 0.4 mg/dL      eGFR Non African Amer 24 mL/min/1.73      Globulin 2.9 gm/dL      A/G Ratio 1.4 g/dL      BUN/Creatinine Ratio 19.8     Anion Gap 11.0 mmol/L     Narrative:       The MDRD GFR formula is only valid for adults with stable renal function between ages 18 and 70.    BNP [[id number]]  (Normal) Collected:  01/15/19 1124    Specimen:  Blood Updated:  01/15/19 1159     proBNP 550.0 pg/mL     Troponin [[id number]]  (Normal) Collected:  01/15/19 1124    Specimen:  Blood Updated:  01/15/19 1159     Troponin I <0.012 ng/mL     D-dimer, Quantitative [[id number]]  (Abnormal) Collected:  01/15/19 1124    Specimen:  Blood Updated:  01/15/19 1148     D-Dimer, Quantitative 1.38 mg/L (FEU)      Narrative:       Reference Range is 0-0.50 mg/L FEU. However, results <0.50 mg/L FEU tends to rule out DVT or PE. Results >0.50 mg/L FEU are not useful in predicting absence or presence of DVT or PE.    Protime-INR [[id number]]  (Abnormal) Collected:  01/15/19 1124    Specimen:  Blood Updated:  01/15/19 1148     Protime 12.4 Seconds      INR 0.90    CBC & Differential [[id number]] Collected:  01/15/19 1124    Specimen:  Blood Updated:  01/15/19 1137    Narrative:       The following orders were created for panel order CBC & Differential.  Procedure                               Abnormality         Status                     ---------                               -----------         ------                     CBC Auto Differential[[id number]]        Abnormal            Final result                 Please view results for these tests on the individual orders.    CBC Auto Differential [[id number]]  (Abnormal) Collected:  01/15/19 1124    Specimen:  Blood Updated:  01/15/19 1137     WBC 6.64 10*3/mm3      RBC 3.57 10*6/mm3      Hemoglobin 10.3 g/dL      Hematocrit 33.5 %      MCV 93.8 fL      MCH 28.9 pg      MCHC 30.7 g/dL      RDW 14.6 %      RDW-SD 50.8 fl      MPV 10.4 fL      Platelets 310 10*3/mm3      Neutrophil % 64.2 %      Lymphocyte % 24.7 %      Monocyte % 6.6 %      Eosinophil % 2.9 %      Basophil % 1.1 %      Immature Grans % 0.5 %      Neutrophils, Absolute 4.27 10*3/mm3      Lymphocytes, Absolute 1.64 10*3/mm3      Monocytes, Absolute 0.44 10*3/mm3      Eosinophils, Absolute 0.19 10*3/mm3      Basophils, Absolute 0.07 10*3/mm3      Immature Grans, Absolute 0.03 10*3/mm3      nRBC 0.0 /100 WBC     Blood Gas, Arterial [[id number]]  (Abnormal) Collected:  01/15/19 1047    Specimen:  Arterial Blood Updated:  01/15/19 1050     Site Left Radial     Bereket's Test Positive     pH, Arterial 7.367 pH units      pCO2, Arterial 38.6 mm Hg      pO2, Arterial 71.6 mm Hg      Comment: 84 Value below reference range         "HCO3, Arterial 22.2 mmol/L      Base Excess, Arterial -2.9 mmol/L      Comment: 84 Value below reference range        O2 Saturation, Arterial 94.1 %      Temperature 37.0 C      Barometric Pressure for Blood Gas 760 mmHg      Modality Room Air     Ventilator Mode NA     Collected by [id number]     Comment: Meter: [id number]     :  [id number]           BC positive for gram positive one bottle likely contaminate   Hospital Course:  The patient is a 89 y.o. female who presented to [facility] with generalzied weakness.   Chest pain and some shortness of air.  She was seen in ER and felt to have pneumonia.  Given IV levaquin.  Per family she had similar diagnosis and was at [facility] recently.  A CT of chest was obtained to further evaluate this.  She did not have any infiltrate on chest CT.  This AM feels better after fluids.  Has been out of bed.   Able to ambulate.   Feels like going home.     Physical Exam on Discharge:  /50 (BP Location: Right arm, Patient Position: Lying)   Pulse 65   Temp 97.4 °F (36.3 °C) (Temporal)   Resp 18   Ht 157.5 cm (62\")   Wt 57.9 kg (127 lb 9.6 oz)   SpO2 99%   BMI 23.34 kg/m²   Physical Exam   Constitutional: She is oriented to person, place, and time. She appears well-developed and well-nourished.   HENT:   Head: Normocephalic and atraumatic.   Eyes: Conjunctivae and EOM are normal. Pupils are equal, round, and reactive to light.   Neck: Neck supple.   Cardiovascular: Normal rate and regular rhythm. Exam reveals no gallop and no friction rub.   No murmur heard.  Pulmonary/Chest: Effort normal and breath sounds normal.   Abdominal: Soft. Bowel sounds are normal. There is no hepatosplenomegaly. There is no tenderness.   Musculoskeletal: Normal range of motion. She exhibits no edema.   Neurological: She is alert and oriented to person, place, and time. No cranial nerve deficit.   Skin: Skin is warm and dry.   Psychiatric: She has a normal mood and affect. " Her behavior is normal.   Nursing note and vitals reviewed.        Condition on Discharge: stable.     Discharge Disposition:  Home or Self Care    Discharge Medications:     Discharge Medications      Continue These Medications      Instructions Start Date   bethanechol 25 MG tablet  Commonly known as:  URECHOLINE   25 mg, Oral, 2 Times Daily      CALCIUM 500 + D PO   1 tablet, Oral, Daily      carvedilol 6.25 MG tablet  Commonly known as:  COREG   6.25 mg, Oral, 2 Times Daily With Meals      clopidogrel 75 MG tablet  Commonly known as:  PLAVIX   75 mg, Oral, Daily      furosemide 40 MG tablet  Commonly known as:  LASIX   20 mg, Oral, 2 Times Daily      gabapentin 400 MG capsule  Commonly known as:  NEURONTIN   400 mg, Oral, Nightly      pantoprazole 40 MG EC tablet  Commonly known as:  PROTONIX   40 mg, Oral, 2 Times Daily      potassium chloride 20 MEQ CR tablet  Commonly known as:  K-DUR,KLOR-CON   20 mEq, Oral, Daily      pravastatin 20 MG tablet  Commonly known as:  PRAVACHOL   20 mg, Oral, Daily      raNITIdine 300 MG tablet  Commonly known as:  ZANTAC   300 mg, Oral, 2 Times Daily      sodium bicarbonate 650 MG tablet   650 mg, Oral, 2 Times Daily      sucralfate 1 g tablet  Commonly known as:  CARAFATE   1 g, Oral, 4 Times Daily      traZODone 100 MG tablet  Commonly known as:  DESYREL   100 mg, Oral, Nightly      TYLENOL 325 MG tablet  Generic drug:  acetaminophen   325 mg, Oral, Every 6 Hours PRN         Stop These Medications    meloxicam 15 MG tablet  Commonly known as:  MOBIC          Discharge Diet:   Diet Instructions     Diet: Regular, Consistent Carbohydrate      Discharge Diet:   Regular  Consistent Carbohydrate           Activity at Discharge:   Activity Instructions     Activity as Tolerated            Follow-up Appointments:   PCP 5-7 days    Test Results Pending at Discharge: none    Monika Theodore DO  01/16/19  1:49 PM    Time: 35 minutes.     No significant past surgical history

## 2019-01-17 ENCOUNTER — READMISSION MANAGEMENT (OUTPATIENT)
Dept: CALL CENTER | Facility: HOSPITAL | Age: 84
End: 2019-01-17

## 2019-01-17 LAB
BACTERIA SPEC AEROBE CULT: ABNORMAL
BACTERIA SPEC AEROBE CULT: ABNORMAL
GRAM STN SPEC: ABNORMAL
GRAM STN SPEC: ABNORMAL
ISOLATED FROM: ABNORMAL
ISOLATED FROM: ABNORMAL

## 2019-01-18 ENCOUNTER — READMISSION MANAGEMENT (OUTPATIENT)
Dept: CALL CENTER | Facility: HOSPITAL | Age: 84
End: 2019-01-18

## 2019-01-18 NOTE — OUTREACH NOTE
COPD/PN Week 1 Survey      Responses   Facility patient discharged from?  [place]   Does the patient have one of the following disease processes/diagnoses(primary or secondary)?  COPD/Pneumonia   Is there a successful TCM telephone encounter documented?  No   Was the primary reason for admission:  Pneumonia   Week 1 attempt successful?  Yes   Call start time  1300   Call end time  1303   Discharge diagnosis  Symptomatic hypotension, Pneumonia, due to infectious organism, bradycardia, KEEGAN question dehydration, Chronic diastolic CHF< hx. of diffuse interstitial pulmonary fibrosis   Meds reviewed with patient/caregiver?  Yes   Is the patient having any side effects they believe may be caused by any medication additions or changes?  No   Does the patient have all medications ordered at discharge?  N/A   Is the patient taking all medications as directed (includes completed medication regime)?  Yes   Does the patient have an appointment with their PCP or pulmonologist within 7 days of discharge?  Yes   Comments regarding PCP  Has pCP appt on 01/23/2019 with Dr Houser   Has the patient kept scheduled appointments due by today?  N/A   What is the Home health agency?   [facility]   Has home health visited the patient within 72 hours of discharge?  Yes   Psychosocial issues?  No   Comments  Daniel reports she is doing much better.    Did the patient receive a copy of their discharge instructions?  Yes   Nursing interventions  Reviewed instructions with patient   What is the patient's perception of their health status since discharge?  Improving   Nursing Interventions  Nurse provided patient education   Is the patient/caregiver able to teach back the hierarchy of who to call/visit for symptoms/problems? PCP, Specialist, Home health nurse, Urgent Care, ED, 911  Yes   Is the patient/caregiver able to teach back signs and symptoms of worsening condition:  Fever/chills, Shortness of breath, Chest pain   Is the  patient/caregiver able to teach back importance of completing antibiotic course of treatment?  Yes   Week 1 call completed?  Yes          Eliud Bales RN

## 2019-01-25 ENCOUNTER — READMISSION MANAGEMENT (OUTPATIENT)
Dept: CALL CENTER | Facility: HOSPITAL | Age: 84
End: 2019-01-25

## 2019-01-25 NOTE — OUTREACH NOTE
COPD/PN Week 2 Survey      Responses   Facility patient discharged from?  [place]   Does the patient have one of the following disease processes/diagnoses(primary or secondary)?  COPD/Pneumonia   Was the primary reason for admission:  Pneumonia   Week 2 attempt successful?  No   Unsuccessful attempts  Attempt 1          Senia Rose RN

## 2019-01-27 ENCOUNTER — READMISSION MANAGEMENT (OUTPATIENT)
Dept: CALL CENTER | Facility: HOSPITAL | Age: 84
End: 2019-01-27

## 2019-01-27 NOTE — OUTREACH NOTE
COPD/PN Week 2 Survey      Responses   Facility patient discharged from?  [place]   Does the patient have one of the following disease processes/diagnoses(primary or secondary)?  COPD/Pneumonia   Was the primary reason for admission:  Pneumonia   Week 2 attempt successful?  No   Unsuccessful attempts  Attempt 2          Malena Alonzo, RN

## 2019-01-30 ENCOUNTER — READMISSION MANAGEMENT (OUTPATIENT)
Dept: CALL CENTER | Facility: HOSPITAL | Age: 84
End: 2019-01-30

## 2019-01-30 NOTE — OUTREACH NOTE
COPD/PN Week 3 Survey      Responses   Facility patient discharged from?  [place]   Does the patient have one of the following disease processes/diagnoses(primary or secondary)?  COPD/Pneumonia   Week 3 attempt successful?  Yes   Call start time  1646   Call end time  1648   Is patient permission given to speak with other caregiver?  Yes   List who call center can speak with  Spouse, Mat Strickland   Person spoke with today (if not patient) and relationship  Mat Pierson reviewed with patient/caregiver?  Yes   Is the patient taking all medications as directed (includes completed medication regime)?  Yes   Comments regarding appointments  Has  seen PCP last week   Does the patient have a primary care provider?   Yes   Has the patient kept scheduled appointments due by today?  Yes   What is the patient's perception of their health status since discharge?  Improving   Week 3 call completed?  Yes   Wrap up additional comments  Spouse feels she is some better, just not leaps and bounds, he said          Linda Varghese, RN

## 2019-02-06 ENCOUNTER — READMISSION MANAGEMENT (OUTPATIENT)
Dept: CALL CENTER | Facility: HOSPITAL | Age: 84
End: 2019-02-06

## 2019-02-06 NOTE — OUTREACH NOTE
COPD/PN Week 4 Survey      Responses   Facility patient discharged from?  [place]   Does the patient have one of the following disease processes/diagnoses(primary or secondary)?  COPD/Pneumonia   Was the primary reason for admission:  Pneumonia   Week 4 attempt successful?  Yes   Call start time  1438   Call end time  1440   Discharge diagnosis  Pneumonia   Meds reviewed with patient/caregiver?  Yes   Is the patient taking all medications as directed (includes completed medication regime)?  Yes   Has the patient kept scheduled appointments due by today?  Yes   Is the patient still receiving Home Health Services?  Yes   Home health comments  Home Health still comes. She came yesterday.   DME comments  Not using oxygen   Psychosocial issues?  No   What is the patient's perception of their health status since discharge?  Improving   Are the patient's immunizations up to date?   Yes [Thinks she had both]   Is the patient able to teach back COPD zones?  Yes   Patient reports what zone on this call?  Green Zone   Week 4 call completed?  Yes   Would the patient like one additional call?  No   Graduated  Yes   Did the patient feel the follow up calls were helpful during their recovery period?  Yes   Was the number of calls appropriate?  Yes          Kati Prakash RN

## 2019-03-04 ENCOUNTER — OFFICE VISIT (OUTPATIENT)
Dept: CARDIOLOGY | Facility: CLINIC | Age: 84
End: 2019-03-04

## 2019-03-04 VITALS
DIASTOLIC BLOOD PRESSURE: 62 MMHG | SYSTOLIC BLOOD PRESSURE: 120 MMHG | BODY MASS INDEX: 24.11 KG/M2 | HEIGHT: 62 IN | OXYGEN SATURATION: 97 % | WEIGHT: 131 LBS | HEART RATE: 61 BPM

## 2019-03-04 DIAGNOSIS — I50.32 CHRONIC DIASTOLIC CHF (CONGESTIVE HEART FAILURE) (HCC): ICD-10-CM

## 2019-03-04 DIAGNOSIS — I25.10 CAD IN NATIVE ARTERY: ICD-10-CM

## 2019-03-04 DIAGNOSIS — N17.9 AKI (ACUTE KIDNEY INJURY) (HCC): Primary | ICD-10-CM

## 2019-03-04 DIAGNOSIS — I34.0 NON-RHEUMATIC MITRAL REGURGITATION: ICD-10-CM

## 2019-03-04 DIAGNOSIS — R06.00 DYSPNEA, UNSPECIFIED TYPE: ICD-10-CM

## 2019-03-04 DIAGNOSIS — M48.50XA COMPRESSION FRACTURE OF VERTEBRAL COLUMN (HCC): ICD-10-CM

## 2019-03-04 DIAGNOSIS — R07.2 PRECORDIAL CHEST PAIN: ICD-10-CM

## 2019-03-04 DIAGNOSIS — I10 ESSENTIAL HYPERTENSION: ICD-10-CM

## 2019-03-04 DIAGNOSIS — E78.2 MIXED HYPERLIPIDEMIA: ICD-10-CM

## 2019-03-04 DIAGNOSIS — R01.1 MURMUR: ICD-10-CM

## 2019-03-04 PROCEDURE — 93000 ELECTROCARDIOGRAM COMPLETE: CPT | Performed by: INTERNAL MEDICINE

## 2019-03-04 PROCEDURE — 99204 OFFICE O/P NEW MOD 45 MIN: CPT | Performed by: INTERNAL MEDICINE

## 2019-03-04 RX ORDER — NITROGLYCERIN 0.4 MG/1
TABLET SUBLINGUAL
Qty: 100 TABLET | Refills: 11 | Status: SHIPPED | OUTPATIENT
Start: 2019-03-04

## 2019-03-04 NOTE — PROGRESS NOTES
Carolina Mari  [id number]  10/20/1928  89 y.o.  female    Referring Provider: Shahid Myers DO    Reason for  Visit:  Initial visit for  shortness of breath   chest pain  coronary artery disease   stented coronary artery   Mild to moderate mitral regurgitation on last echo 1/19 [facility]   Christina clip in past     Subjective     Chest pain at rest, moderate substernal, pressure like. Lasts less than 5 minutes  Started 6-8  weeks ago  Occurs once or twice a week  No associated diaphoresis    No associated nausea  Radiation to throat  Precipitated with exertion and also at rest  Relieved with rest  Not positional    No change with intake of food or antacids  No change with breathing  Moderate associated dyspnea      Some BP low         History of present illness:  Carolina Mari is a 89 y.o. yo female with history of congestive heart failure mitral regurgitation s/p christina clip who presents today for   Chief Complaint   Patient presents with   • Chest Pain     1 month d/c follow up    • Shortness of Breath   • Slow Heart Rate   .    History  Past Medical History:   Diagnosis Date   • Arthritis    • Chronic diastolic CHF (congestive heart failure) (CMS/HCC) 1/30/2017   • Chronic osteoarthritis 1/30/2017   • Coronary arteriosclerosis 1/30/2017   • Diastolic dysfunction 1/30/2017   • Diffuse interstitial pulmonary fibrosis (CMS/HCC) 1/30/2017   • Dyspnea 1/30/2017   • Fibrosis of lung (CMS/HCC) 1/30/2017   • GERD (gastroesophageal reflux disease) 1/30/2017   • Heart disease    • History of transfusion    • Hyperlipidemia 1/30/2017   • Hypertension    • Low back pain 1/30/2017   • Malignant neoplasm of skin 1/30/2017   • Mitral valve regurgitation    • Murmur 1/30/2017   • Prerenal renal failure 1/30/2017   • Spinal stenosis of lumbar region 1/30/2017   ,   Past Surgical History:   Procedure Laterality Date   • APPENDECTOMY     • CHOLECYSTECTOMY     • COLON SURGERY  05/06/2015    POLYP REMOVAL    • CORONARY ANGIOPLASTY WITH STENT PLACEMENT      STENTS X3   • HYSTERECTOMY     • MITRAL VALVE REPAIR/REPLACEMENT      repair with MITRACLIP    ,   Family History   Problem Relation Age of Onset   • Heart attack Father    • COPD Other    • Diabetes Other    • Heart disease Other    ,   Social History     Tobacco Use   • Smoking status: Never Smoker   • Smokeless tobacco: Never Used   Substance Use Topics   • Alcohol use: No   • Drug use: No   ,     Medications  Current Outpatient Medications   Medication Sig Dispense Refill   • acetaminophen (TYLENOL) 325 MG tablet Take 325 mg by mouth Every 6 (Six) Hours As Needed for Mild Pain .     • bethanechol (URECHOLINE) 25 MG tablet Take 25 mg by mouth 2 (Two) Times a Day.     • Calcium Carbonate-Vitamin D (CALCIUM 500 + D PO) Take 1 tablet by mouth Daily.     • carvedilol (COREG) 6.25 MG tablet Take 6.25 mg by mouth 2 (Two) Times a Day With Meals.     • clopidogrel (PLAVIX) 75 MG tablet Take 75 mg by mouth Daily.     • furosemide (LASIX) 40 MG tablet Take 20 mg by mouth 2 (Two) Times a Day.     • gabapentin (NEURONTIN) 400 MG capsule Take 400 mg by mouth Every Night.     • pantoprazole (PROTONIX) 40 MG EC tablet Take 40 mg by mouth 2 (Two) Times a Day.     • potassium chloride (K-DUR,KLOR-CON) 20 MEQ CR tablet Take 20 mEq by mouth Daily.     • pravastatin (PRAVACHOL) 20 MG tablet Take 20 mg by mouth Daily.     • raNITIdine (ZANTAC) 300 MG tablet Take 300 mg by mouth 2 (Two) Times a Day.     • sodium bicarbonate 650 MG tablet Take 650 mg by mouth 2 (Two) Times a Day.     • sucralfate (CARAFATE) 1 G tablet Take 1 g by mouth 4 (Four) Times a Day.     • traZODone (DESYREL) 100 MG tablet Take 100 mg by mouth Every Night.       No current facility-administered medications for this visit.        Allergies:  Aspirin; Codeine; Contrast dye; Iodine; Lisinopril; and Penicillins    Review of Systems  Review of Systems   Constitution: Negative.   HENT: Negative.    Eyes: Negative.   "  Cardiovascular: Positive for chest pain and dyspnea on exertion. Negative for claudication, cyanosis, irregular heartbeat, leg swelling, near-syncope, orthopnea, palpitations, paroxysmal nocturnal dyspnea and syncope.   Respiratory: Negative.    Endocrine: Negative.    Hematologic/Lymphatic: Negative.    Skin: Negative.    Musculoskeletal: Positive for arthritis and back pain.   Gastrointestinal: Negative for anorexia.   Genitourinary: Negative.    Neurological: Negative.    Psychiatric/Behavioral: Negative.        Objective     Physical Exam:  /62 (BP Location: Left arm, Patient Position: Sitting, Cuff Size: Adult)   Pulse 61   Ht 157.5 cm (62\")   Wt 59.4 kg (131 lb)   SpO2 97%   BMI 23.96 kg/m²     Physical Exam   Constitutional: She appears well-developed.   HENT:   Head: Normocephalic.   Neck: Normal carotid pulses and no JVD present. No tracheal tenderness present. Carotid bruit is not present. No tracheal deviation and no edema present.   Cardiovascular: Regular rhythm and normal pulses.   Murmur heard.   Systolic murmur is present with a grade of 2/6.  Pulmonary/Chest: Effort normal. No stridor.   Abdominal: Soft.   Neurological: She is alert. She has normal strength. No cranial nerve deficit or sensory deficit.   Skin: Skin is warm.   Psychiatric: She has a normal mood and affect. Her speech is normal and behavior is normal.       Results Review:    Advisory     Will see the patient soon per appointment or sooner if required  Patient advised in advance that there may be a longer wait time next follow up with  The patient given the option to see another provider that the patient declined       ECG 12 Lead  Date/Time: 3/4/2019 1:11 PM  Performed by: Angel Fajardo MD  Authorized by: Angel Fajardo MD   Comparison: compared with previous ECG from 1/15/2019  Similar to previous ECG  Rhythm: sinus bradycardia  Rate: bradycardic  T inversion: II, III and aVF  T flattening: V4, V5 and V6  QRS axis: " normal    Clinical impression: abnormal EKG            Assessment/Plan   Carolina was seen today for chest pain, shortness of breath and slow heart rate.    Diagnoses and all orders for this visit:    KEEGAN (acute kidney injury) (CMS/[facility])    CAD in native artery    Chronic diastolic CHF (congestive heart failure) (CMS/[facility])    Compression fracture of vertebral column (CMS/[facility])    Dyspnea, unspecified type    Essential hypertension    Mixed hyperlipidemia    Murmur    Non-rheumatic mitral regurgitation           Plan    Lexiscan stress test, unable to walk or run on treadmill with fall risk     Orders Placed This Encounter   Procedures   • Stress Test With Myocardial Perfusion One Day     Standing Status:   Future     Standing Expiration Date:   3/3/2020     Order Specific Question:   What stress agent will be used?     Answer:   Regadenoson (Lexiscan)     Order Specific Question:   Difficulty walking criteria?     Answer:   Musculoskeletal (hips, knees, feet, back, amputee)     Order Specific Question:   Reason for exam?     Answer:   Chest Pain   • ECG 12 Lead     This order was created via procedure documentation      Requested Prescriptions     Signed Prescriptions Disp Refills   • nitroglycerin (NITROSTAT) 0.4 MG SL tablet 100 tablet 11     Si under the tongue as needed for angina, may repeat q5mins for up three doses      If BP less than 100 mm Hg systolic then hold Coreg for 12 hours    ____________________________________________________________________________________________________________________________________________  Health maintenance and recommendations      Low salt/ HTN/ Heart healthy carbohydrate restricted cardiac diet   The patient is advised to reduce or avoid caffeine or other cardiac stimulants.     Minimize or avoid  NSAID-type medications        Monitor for any signs of bleeding including red or dark stools. Fall precautions.        Advised staying uptodate with immunizations per established  standard guidelines.      Offered to give patient  a copy of my notes       Questions were encouraged, asked and answered to the patient's  understanding and satisfaction. Questions if any regarding current medications and side effects, need for refills and importance of compliance to medications stressed.    Reviewed available prior notes, consults, prior visits, laboratory findings, radiology and cardiology relevant reports. Updated chart as applicable. I have reviewed the patient's medical history in detail and updated the computerized patient record as relevant.      Updated patient regarding any new or relevant abnormalities on review of records or any new findings on physical exam. Mentioned to patient about purpose of visit and desirable health short and long term goals and objectives.    Primary to monitor CBC CMP Lipid panel and TSH as applicable    ___________________________________________________________________________________________________________________________________________          No Follow-up on file.

## 2019-03-18 ENCOUNTER — HOSPITAL ENCOUNTER (OUTPATIENT)
Dept: CARDIOLOGY | Facility: HOSPITAL | Age: 84
Discharge: HOME | End: 2019-03-18

## 2019-03-18 VITALS
DIASTOLIC BLOOD PRESSURE: 63 MMHG | BODY MASS INDEX: 24.1 KG/M2 | HEIGHT: 62 IN | HEART RATE: 50 BPM | SYSTOLIC BLOOD PRESSURE: 112 MMHG | WEIGHT: 130.95 LBS

## 2019-03-18 DIAGNOSIS — R07.2 PRECORDIAL CHEST PAIN: ICD-10-CM

## 2019-03-18 LAB
BH CV NUCLEAR PRIOR STUDY: 3
BH CV STRESS BP STAGE 1: NORMAL
BH CV STRESS COMMENTS STAGE 1: NORMAL
BH CV STRESS DOSE REGADENOSON STAGE 1: 0.4
BH CV STRESS DURATION MIN STAGE 1: 0
BH CV STRESS DURATION SEC STAGE 1: 10
BH CV STRESS HR STAGE 1: 53
BH CV STRESS PROTOCOL 1: NORMAL
BH CV STRESS RECOVERY BP: NORMAL MMHG
BH CV STRESS RECOVERY HR: 65 BPM
BH CV STRESS STAGE 1: 1
LV EF NUC BP: 75 %
MAXIMAL PREDICTED HEART RATE: 130 BPM
PERCENT MAX PREDICTED HR: 40.77 %
STRESS BASELINE BP: NORMAL MMHG
STRESS BASELINE HR: 50 BPM
STRESS PERCENT HR: 48 %
STRESS POST EXERCISE DUR MIN: 0 MIN
STRESS POST EXERCISE DUR SEC: 10 SEC
STRESS POST PEAK BP: NORMAL MMHG
STRESS POST PEAK HR: 53 BPM
STRESS TARGET HR: 111 BPM

## 2019-03-18 PROCEDURE — 93018 CV STRESS TEST I&R ONLY: CPT | Performed by: INTERNAL MEDICINE

## 2019-03-18 PROCEDURE — 78452 HT MUSCLE IMAGE SPECT MULT: CPT

## 2019-03-18 PROCEDURE — 0 TECHNETIUM SESTAMIBI: Performed by: INTERNAL MEDICINE

## 2019-03-18 PROCEDURE — 78452 HT MUSCLE IMAGE SPECT MULT: CPT | Performed by: INTERNAL MEDICINE

## 2019-03-18 PROCEDURE — A9500 TC99M SESTAMIBI: HCPCS | Performed by: INTERNAL MEDICINE

## 2019-03-18 PROCEDURE — 93017 CV STRESS TEST TRACING ONLY: CPT

## 2019-03-18 PROCEDURE — 25010000002 REGADENOSON 0.4 MG/5ML SOLUTION: Performed by: INTERNAL MEDICINE

## 2019-03-18 RX ADMIN — TECHNETIUM TC 99M SESTAMIBI 1 DOSE: 1 INJECTION INTRAVENOUS at 09:43

## 2019-03-18 RX ADMIN — TECHNETIUM TC 99M SESTAMIBI 1 DOSE: 1 INJECTION INTRAVENOUS at 08:10

## 2019-03-18 RX ADMIN — REGADENOSON 0.4 MG: 0.08 INJECTION, SOLUTION INTRAVENOUS at 09:42

## 2019-04-16 ENCOUNTER — OFFICE VISIT (OUTPATIENT)
Dept: CARDIOLOGY | Facility: CLINIC | Age: 84
End: 2019-04-16

## 2019-04-16 VITALS
SYSTOLIC BLOOD PRESSURE: 110 MMHG | BODY MASS INDEX: 24.48 KG/M2 | DIASTOLIC BLOOD PRESSURE: 68 MMHG | HEART RATE: 61 BPM | OXYGEN SATURATION: 98 % | WEIGHT: 133 LBS | HEIGHT: 62 IN

## 2019-04-16 DIAGNOSIS — R00.1 BRADYCARDIA: Primary | ICD-10-CM

## 2019-04-16 DIAGNOSIS — R13.19 OTHER DYSPHAGIA: ICD-10-CM

## 2019-04-16 DIAGNOSIS — I25.10 CAD IN NATIVE ARTERY: ICD-10-CM

## 2019-04-16 DIAGNOSIS — M19.90 CHRONIC OSTEOARTHRITIS: ICD-10-CM

## 2019-04-16 DIAGNOSIS — I10 ESSENTIAL HYPERTENSION: ICD-10-CM

## 2019-04-16 DIAGNOSIS — I50.32 CHRONIC DIASTOLIC CHF (CONGESTIVE HEART FAILURE) (HCC): ICD-10-CM

## 2019-04-16 DIAGNOSIS — R07.2 PRECORDIAL CHEST PAIN: ICD-10-CM

## 2019-04-16 PROCEDURE — 99214 OFFICE O/P EST MOD 30 MIN: CPT | Performed by: INTERNAL MEDICINE

## 2019-04-16 RX ORDER — ISOSORBIDE MONONITRATE 30 MG/1
30 TABLET, EXTENDED RELEASE ORAL DAILY
Qty: 30 TABLET | Refills: 2 | Status: SHIPPED | OUTPATIENT
Start: 2019-04-16 | End: 2020-01-02

## 2019-04-16 RX ORDER — ISOSORBIDE MONONITRATE 30 MG/1
30 TABLET, EXTENDED RELEASE ORAL DAILY
Qty: 90 TABLET | Refills: 3 | Status: SHIPPED | OUTPATIENT
Start: 2019-04-16 | End: 2021-06-24 | Stop reason: SDUPTHER

## 2019-04-16 NOTE — PROGRESS NOTES
Nita Mari  [id number]  10/20/1928  89 y.o.  female    Referring Provider: Shahid Myers DO    Reason for  Visit:  Initial visit for  shortness of breath   chest pain  coronary artery disease   stented coronary artery   Mild to moderate mitral regurgitation on last echo 1/19 [facility]   Christina clip in past     Subjective       Similar symptoms as during last visit    Another moderae to severe episode of chest pain last week for 30 minutes with radiation to left jaw  This lasted for 30 minutes    Chest pain at rest, moderate substernal, pressure like. Lasts less than 5 minutes  Started 6-8  weeks ago  Occurs once or twice a week  No associated diaphoresis    No associated nausea  Radiation to throat  Precipitated with exertion and also at rest  Relieved with rest  Not positional    No change with intake of food or antacids  No change with breathing  Moderate associated dyspnea      Some BP low         History of present illness:  Nita Mari is a 89 y.o. yo female with history of congestive heart failure mitral regurgitation s/p christina clip who presents today for   Chief Complaint   Patient presents with   • Coronary Artery Disease     1 MON FU/ RESULTS    • Shortness of Breath   • Chest Pain     LEFT SIDE UP NECK AND EAR    .    History  Past Medical History:   Diagnosis Date   • Arthritis    • Chronic diastolic CHF (congestive heart failure) (CMS/HCC) 1/30/2017   • Chronic osteoarthritis 1/30/2017   • Coronary arteriosclerosis 1/30/2017   • Diastolic dysfunction 1/30/2017   • Diffuse interstitial pulmonary fibrosis (CMS/HCC) 1/30/2017   • Dyspnea 1/30/2017   • Fibrosis of lung (CMS/HCC) 1/30/2017   • GERD (gastroesophageal reflux disease) 1/30/2017   • Heart disease    • History of transfusion    • Hyperlipidemia 1/30/2017   • Hypertension    • Low back pain 1/30/2017   • Malignant neoplasm of skin 1/30/2017   • Mitral valve regurgitation    • Murmur 1/30/2017   •  Prerenal renal failure 1/30/2017   • Spinal stenosis of lumbar region 1/30/2017   ,   Past Surgical History:   Procedure Laterality Date   • APPENDECTOMY     • CARDIAC CATHETERIZATION     • CHOLECYSTECTOMY     • COLON SURGERY  05/06/2015    POLYP REMOVAL   • CORONARY ANGIOPLASTY WITH STENT PLACEMENT      STENTS X3   • HYSTERECTOMY     • MITRAL VALVE REPAIR/REPLACEMENT      repair with MITRACLIP    ,   Family History   Problem Relation Age of Onset   • Heart attack Father    • COPD Other    • Diabetes Other    • Heart disease Other    ,   Social History     Tobacco Use   • Smoking status: Never Smoker   • Smokeless tobacco: Never Used   Substance Use Topics   • Alcohol use: No   • Drug use: No   ,     Medications  Current Outpatient Medications   Medication Sig Dispense Refill   • acetaminophen (TYLENOL) 325 MG tablet Take 325 mg by mouth Every 6 (Six) Hours As Needed for Mild Pain .     • bethanechol (URECHOLINE) 25 MG tablet Take 25 mg by mouth 2 (Two) Times a Day.     • Calcium Carbonate-Vitamin D (CALCIUM 500 + D PO) Take 1 tablet by mouth Daily.     • carvedilol (COREG) 6.25 MG tablet Take 6.25 mg by mouth 2 (Two) Times a Day With Meals.     • clopidogrel (PLAVIX) 75 MG tablet Take 75 mg by mouth Daily.     • furosemide (LASIX) 40 MG tablet Take 20 mg by mouth 2 (Two) Times a Day.     • gabapentin (NEURONTIN) 400 MG capsule Take 400 mg by mouth Every Night.     • nitroglycerin (NITROSTAT) 0.4 MG SL tablet 1 under the tongue as needed for angina, may repeat q5mins for up three doses 100 tablet 11   • pantoprazole (PROTONIX) 40 MG EC tablet Take 40 mg by mouth 2 (Two) Times a Day.     • potassium chloride (K-DUR,KLOR-CON) 20 MEQ CR tablet Take 20 mEq by mouth Daily.     • pravastatin (PRAVACHOL) 20 MG tablet Take 20 mg by mouth Daily.     • raNITIdine (ZANTAC) 300 MG tablet Take 300 mg by mouth 2 (Two) Times a Day.     • sodium bicarbonate 650 MG tablet Take 650 mg by mouth 2 (Two) Times a Day.     • sucralfate  "(CARAFATE) 1 G tablet Take 1 g by mouth 4 (Four) Times a Day.     • traZODone (DESYREL) 100 MG tablet Take 100 mg by mouth Every Night.     • isosorbide mononitrate (IMDUR) 30 MG 24 hr tablet Take 1 tablet by mouth Daily. 90 tablet 3   • isosorbide mononitrate (IMDUR) 30 MG 24 hr tablet Take 1 tablet by mouth Daily. 30 tablet 2     No current facility-administered medications for this visit.        Allergies:  Aspirin; Codeine; Contrast dye; Iodine; Lisinopril; and Penicillins    Review of Systems  Review of Systems   Constitution: Positive for weakness and malaise/fatigue.   HENT: Negative.    Eyes: Negative.    Cardiovascular: Positive for chest pain and dyspnea on exertion. Negative for claudication, cyanosis, irregular heartbeat, leg swelling, near-syncope, orthopnea, palpitations, paroxysmal nocturnal dyspnea and syncope.   Respiratory: Negative.    Endocrine: Negative.    Hematologic/Lymphatic: Negative.    Skin: Negative.    Musculoskeletal: Positive for arthritis and back pain.   Gastrointestinal: Negative for anorexia.   Genitourinary: Negative.    Psychiatric/Behavioral: Negative.        Objective     Physical Exam:  /68   Pulse 61   Ht 157.5 cm (62.01\")   Wt 60.3 kg (133 lb)   SpO2 98%   BMI 24.32 kg/m²     Physical Exam   Constitutional: She appears well-developed.   HENT:   Head: Normocephalic.   Neck: Normal carotid pulses and no JVD present. No tracheal tenderness present. Carotid bruit is not present. No tracheal deviation and no edema present.   Cardiovascular: Regular rhythm and normal pulses.   Murmur heard.   Systolic murmur is present with a grade of 2/6.  Pulmonary/Chest: Effort normal. No stridor.   Abdominal: Soft.   Neurological: She is alert. She has normal strength. No cranial nerve deficit or sensory deficit.   Skin: Skin is warm.   Psychiatric: She has a normal mood and affect. Her speech is normal and behavior is normal.       Results Review:    2014 " [place]    HEMODYNAMICS:  1.  LV pressure 130/16.   2.  No gradient across the aortic valve.   3.  Left ventriculography ejection fraction 60%.  No identifiable wall motion  abnormality.      IMPRESSION:  1.  Widely patent.  (Previously placed stents in both the proximal and  midportion of the left anterior descending).  2.  Mild to moderate diffuse coronary plaque, at this point left to be treated  medically.   3.  Preserved left ventricular systolic function.   4.  No significant aortic valve stenosis or mitral valve regurgitation.       myocardial perfusion scan    · Myocardial perfusion imaging indicates a medium-sized infarct located in the anterior wall and lateral wall with no significant ischemia noted.  · Left ventricular ejection fraction is hyperdynamic (Calculated EF > 70%).  · Breast attenuation artifact is present.  Advisory     Will see the patient soon per appointment or sooner if required  Patient advised in advance that there may be a longer wait time next follow up with  The patient given the option to see another provider that the patient declined        Procedures    Assessment/Plan   Nita was seen today for coronary artery disease, shortness of breath and chest pain.    Diagnoses and all orders for this visit:    Bradycardia    CAD in native artery    Chronic diastolic CHF (congestive heart failure) (CMS/[facility])    Chronic osteoarthritis    Other dysphagia    Essential hypertension    Precordial chest pain    Other orders  -     isosorbide mononitrate (IMDUR) 30 MG 24 hr tablet; Take 1 tablet by mouth Daily.  -     isosorbide mononitrate (IMDUR) 30 MG 24 hr tablet; Take 1 tablet by mouth Daily.           Plan     Conservative medical therapy per patient. Risks, benefits and alternatives explained. The patient understands and wishes to proceed with only conservative therapy.  The patient expressively declined any invasive testing or treatment       Will start Imdur 30 mg daily and if she still  has chest pain then consider cardiac cath    Requested Prescriptions     Signed Prescriptions Disp Refills   • isosorbide mononitrate (IMDUR) 30 MG 24 hr tablet 90 tablet 3     Sig: Take 1 tablet by mouth Daily.   • isosorbide mononitrate (IMDUR) 30 MG 24 hr tablet 30 tablet 2     Sig: Take 1 tablet by mouth Daily.    S/L NTG PRN for chest pain, call me or go to ER if has to use S/L nitroglycerin       ____________________________________________________________________________________________________________________________________________  Health maintenance and recommendations      Similar recommendations as last visit      Offered to give patient  a copy of my notes       Questions were encouraged, asked and answered to the patient's  understanding and satisfaction. Questions if any regarding current medications and side effects, need for refills and importance of compliance to medications stressed.    Reviewed available prior notes, consults, prior visits, laboratory findings, radiology and cardiology relevant reports. Updated chart as applicable. I have reviewed the patient's medical history in detail and updated the computerized patient record as relevant.      Updated patient regarding any new or relevant abnormalities on review of records or any new findings on physical exam. Mentioned to patient about purpose of visit and desirable health short and long term goals and objectives.    Primary to monitor CBC CMP Lipid panel and TSH as applicable    ___________________________________________________________________________________________________________________________________________          Return in about 6 weeks (around 5/28/2019).

## 2019-05-29 ENCOUNTER — OFFICE VISIT (OUTPATIENT)
Dept: CARDIOLOGY | Facility: CLINIC | Age: 84
End: 2019-05-29

## 2019-05-29 VITALS
WEIGHT: 132 LBS | DIASTOLIC BLOOD PRESSURE: 50 MMHG | OXYGEN SATURATION: 97 % | BODY MASS INDEX: 24.29 KG/M2 | SYSTOLIC BLOOD PRESSURE: 102 MMHG | HEART RATE: 57 BPM | HEIGHT: 62 IN

## 2019-05-29 DIAGNOSIS — I51.89 DIASTOLIC DYSFUNCTION: ICD-10-CM

## 2019-05-29 DIAGNOSIS — R06.09 DOE (DYSPNEA ON EXERTION): ICD-10-CM

## 2019-05-29 DIAGNOSIS — E78.2 MIXED HYPERLIPIDEMIA: ICD-10-CM

## 2019-05-29 DIAGNOSIS — R09.02 EXERCISE HYPOXEMIA: ICD-10-CM

## 2019-05-29 DIAGNOSIS — I10 ESSENTIAL HYPERTENSION: ICD-10-CM

## 2019-05-29 DIAGNOSIS — M19.90 CHRONIC OSTEOARTHRITIS: ICD-10-CM

## 2019-05-29 DIAGNOSIS — I25.10 CAD IN NATIVE ARTERY: Primary | ICD-10-CM

## 2019-05-29 DIAGNOSIS — R00.1 BRADYCARDIA: ICD-10-CM

## 2019-05-29 DIAGNOSIS — J84.10 DIFFUSE INTERSTITIAL PULMONARY FIBROSIS (HCC): ICD-10-CM

## 2019-05-29 PROCEDURE — 99214 OFFICE O/P EST MOD 30 MIN: CPT | Performed by: INTERNAL MEDICINE

## 2019-05-29 PROCEDURE — 93000 ELECTROCARDIOGRAM COMPLETE: CPT | Performed by: INTERNAL MEDICINE

## 2019-05-29 NOTE — PROGRESS NOTES
Nita Mari  [id number]  10/20/1928  89 y.o.  female    Referring Provider: Shahid Myers DO    Reason for  Visit:  Initial visit for  shortness of breath   coronary artery disease   stented coronary artery   Mild to moderate mitral regurgitation on last echo 1/19 [facility]   Christina clip in past       Subjective    Moderate to severe exertional shortness of breath on exertion relieved with rest  No significant cough or wheezing  Going on for several months or longer    No palpitations  No associated chest pain  No significant pedal edema    No fever or chills  No significant expectoration    No hemoptysis  No presyncope or syncope     Occasional chest pain now better    Joint pain in small, medium and large joints   Chronic low back pain      History of present illness:  Nita Mari is a 89 y.o. yo female with history of congestive heart failure mitral regurgitation s/p christina clip who presents today for   Chief Complaint   Patient presents with   • Slow Heart Rate     6 WK FU    • Shortness of Breath   • Fatigue   • Dizziness   • Chest Pain   • Coronary Artery Disease     STENT X 3   .    History  Past Medical History:   Diagnosis Date   • Arthritis    • Chronic diastolic CHF (congestive heart failure) (CMS/HCC) 1/30/2017   • Chronic osteoarthritis 1/30/2017   • Coronary arteriosclerosis 1/30/2017   • Diastolic dysfunction 1/30/2017   • Diffuse interstitial pulmonary fibrosis (CMS/HCC) 1/30/2017   • Dyspnea 1/30/2017   • Fibrosis of lung (CMS/HCC) 1/30/2017   • GERD (gastroesophageal reflux disease) 1/30/2017   • Heart disease    • History of transfusion    • Hyperlipidemia 1/30/2017   • Hypertension    • Low back pain 1/30/2017   • Malignant neoplasm of skin 1/30/2017   • Mitral valve regurgitation    • Murmur 1/30/2017   • Prerenal renal failure 1/30/2017   • Spinal stenosis of lumbar region 1/30/2017   ,   Past Surgical History:   Procedure Laterality Date   •  APPENDECTOMY     • CARDIAC CATHETERIZATION     • CHOLECYSTECTOMY     • COLON SURGERY  05/06/2015    POLYP REMOVAL   • CORONARY ANGIOPLASTY WITH STENT PLACEMENT      STENTS X3   • HYSTERECTOMY     • MITRAL VALVE REPAIR/REPLACEMENT      repair with MITRACLIP    ,   Family History   Problem Relation Age of Onset   • Heart attack Father    • COPD Other    • Diabetes Other    • Heart disease Other    ,   Social History     Tobacco Use   • Smoking status: Never Smoker   • Smokeless tobacco: Never Used   Substance Use Topics   • Alcohol use: No   • Drug use: No   ,     Medications  Current Outpatient Medications   Medication Sig Dispense Refill   • acetaminophen (TYLENOL) 325 MG tablet Take 325 mg by mouth Every 6 (Six) Hours As Needed for Mild Pain .     • bethanechol (URECHOLINE) 25 MG tablet Take 25 mg by mouth 2 (Two) Times a Day.     • Calcium Carbonate-Vitamin D (CALCIUM 500 + D PO) Take 1 tablet by mouth Daily.     • carvedilol (COREG) 6.25 MG tablet Take 6.25 mg by mouth 2 (Two) Times a Day With Meals.     • clopidogrel (PLAVIX) 75 MG tablet Take 75 mg by mouth Daily.     • furosemide (LASIX) 40 MG tablet Take 20 mg by mouth 2 (Two) Times a Day.     • gabapentin (NEURONTIN) 400 MG capsule Take 400 mg by mouth Every Night.     • isosorbide mononitrate (IMDUR) 30 MG 24 hr tablet Take 1 tablet by mouth Daily. 90 tablet 3   • isosorbide mononitrate (IMDUR) 30 MG 24 hr tablet Take 1 tablet by mouth Daily. 30 tablet 2   • nitroglycerin (NITROSTAT) 0.4 MG SL tablet 1 under the tongue as needed for angina, may repeat q5mins for up three doses 100 tablet 11   • pantoprazole (PROTONIX) 40 MG EC tablet Take 40 mg by mouth 2 (Two) Times a Day.     • potassium chloride (K-DUR,KLOR-CON) 20 MEQ CR tablet Take 20 mEq by mouth Daily.     • pravastatin (PRAVACHOL) 20 MG tablet Take 20 mg by mouth Daily.     • raNITIdine (ZANTAC) 300 MG tablet Take 300 mg by mouth 2 (Two) Times a Day.     • sodium bicarbonate 650 MG tablet Take 650  "mg by mouth 2 (Two) Times a Day.     • sucralfate (CARAFATE) 1 G tablet Take 1 g by mouth 4 (Four) Times a Day.     • traZODone (DESYREL) 100 MG tablet Take 100 mg by mouth Every Night.       No current facility-administered medications for this visit.        Allergies:  Aspirin; Codeine; Contrast dye; Iodine; Lisinopril; and Penicillins    Review of Systems  Review of Systems   Constitution: Positive for weakness and malaise/fatigue.   HENT: Negative.    Eyes: Negative.    Cardiovascular: Positive for chest pain and dyspnea on exertion. Negative for claudication, cyanosis, irregular heartbeat, leg swelling, near-syncope, orthopnea, palpitations, paroxysmal nocturnal dyspnea and syncope.   Respiratory: Negative.    Endocrine: Negative.    Hematologic/Lymphatic: Negative.    Skin: Negative.    Musculoskeletal: Positive for arthritis and back pain.   Gastrointestinal: Negative for anorexia.   Genitourinary: Negative.    Psychiatric/Behavioral: Negative.        Objective     Physical Exam:  /50   Pulse 57   Ht 157.5 cm (62.01\")   Wt 59.9 kg (132 lb)   SpO2 97%   BMI 24.14 kg/m²     Physical Exam   Constitutional: She appears well-developed.   HENT:   Head: Normocephalic.   Neck: Normal carotid pulses and no JVD present. No tracheal tenderness present. Carotid bruit is not present. No tracheal deviation and no edema present.   Cardiovascular: Regular rhythm and normal pulses.   Murmur heard.   Systolic murmur is present with a grade of 2/6.  Pulmonary/Chest: Effort normal. No stridor.   Abdominal: Soft.   Neurological: She is alert. She has normal strength. No cranial nerve deficit or sensory deficit.   Skin: Skin is warm.   Psychiatric: She has a normal mood and affect. Her speech is normal and behavior is normal.       Results Review:    2014 Dr Broadbent    HEMODYNAMICS:  1.  LV pressure 130/16.   2.  No gradient across the aortic valve.   3.  Left ventriculography ejection fraction 60%.  No identifiable " wall motion  abnormality.      IMPRESSION:  1.  Widely patent.  (Previously placed stents in both the proximal and  midportion of the left anterior descending).  2.  Mild to moderate diffuse coronary plaque, at this point left to be treated  medically.   3.  Preserved left ventricular systolic function.   4.  No significant aortic valve stenosis or mitral valve regurgitation.       myocardial perfusion scan    · Myocardial perfusion imaging indicates a medium-sized infarct located in the anterior wall and lateral wall with no significant ischemia noted.  · Left ventricular ejection fraction is hyperdynamic (Calculated EF > 70%).  · Breast attenuation artifact is present.  Advisory     Will see the patient soon per appointment or sooner if required  Patient advised in advance that there may be a longer wait time next follow up with  The patient given the option to see another provider that the patient declined          ECG 12 Lead  Date/Time: 5/29/2019 12:13 PM  Performed by: Angel Fajardo MD  Authorized by: Angel Fajardo MD   Comparison: compared with previous ECG from 3/4/2019  Similar to previous ECG  Rhythm: sinus bradycardia  Rate: bradycardic  Conduction: conduction normal  ST Segments: ST segments normal  T inversion: V1, V2 and V3  QRS axis: normal    Clinical impression: abnormal EKG            Assessment/Plan   Nita was seen today for slow heart rate, shortness of breath, fatigue, dizziness, chest pain and coronary artery disease.    Diagnoses and all orders for this visit:    CAD in native artery    Bradycardia    Diastolic dysfunction    Chronic osteoarthritis    Diffuse interstitial pulmonary fibrosis (CMS/HCC)    Essential hypertension    Mixed hyperlipidemia    Exercise hypoxemia  -     Ambulatory Referral to Pulmonology    Other orders  -     ECG 12 Lead           Plan     Recommend continous and just not nightly oxygen therapy as as resting oxygen saturation is 96% . Desaturation with light exertion  to 72%. 99% with 2 LPM supplemental oxygen in recovery on exertion.        S/L NTG PRN for chest pain, call me or go to ER if has to use S/L nitroglycerin     Orders Placed This Encounter   Procedures   • Ambulatory Referral to Pulmonology     Referral Priority:   Routine     Referral Type:   Consultation     Referral Reason:   Specialty Services Required     Requested Specialty:   Pulmonary Disease     Number of Visits Requested:   1   • ECG 12 Lead     This order was created via procedure documentation          ____________________________________________________________________________________________________________________________________________  Health maintenance and recommendations      Similar recommendations as last visit      Offered to give patient  a copy of my notes       Questions were encouraged, asked and answered to the patient's  understanding and satisfaction. Questions if any regarding current medications and side effects, need for refills and importance of compliance to medications stressed.    Reviewed available prior notes, consults, prior visits, laboratory findings, radiology and cardiology relevant reports. Updated chart as applicable. I have reviewed the patient's medical history in detail and updated the computerized patient record as relevant.      Updated patient regarding any new or relevant abnormalities on review of records or any new findings on physical exam. Mentioned to patient about purpose of visit and desirable health short and long term goals and objectives.    Primary to monitor CBC CMP Lipid panel and TSH as applicable    ___________________________________________________________________________________________________________________________________________          Return in about 3 months (around 8/29/2019).

## 2019-07-15 ENCOUNTER — OFFICE VISIT (OUTPATIENT)
Dept: PULMONOLOGY | Facility: CLINIC | Age: 84
End: 2019-07-15

## 2019-07-15 VITALS
SYSTOLIC BLOOD PRESSURE: 126 MMHG | WEIGHT: 132.6 LBS | DIASTOLIC BLOOD PRESSURE: 80 MMHG | HEART RATE: 58 BPM | HEIGHT: 60 IN | BODY MASS INDEX: 26.03 KG/M2 | OXYGEN SATURATION: 94 %

## 2019-07-15 DIAGNOSIS — I34.0 NON-RHEUMATIC MITRAL REGURGITATION: ICD-10-CM

## 2019-07-15 DIAGNOSIS — J84.10 DIFFUSE INTERSTITIAL PULMONARY FIBROSIS (HCC): Primary | ICD-10-CM

## 2019-07-15 DIAGNOSIS — R06.00 DYSPNEA, UNSPECIFIED TYPE: Primary | ICD-10-CM

## 2019-07-15 DIAGNOSIS — J98.4 RESTRICTIVE LUNG DISEASE: ICD-10-CM

## 2019-07-15 DIAGNOSIS — M41.25 OTHER IDIOPATHIC SCOLIOSIS, THORACOLUMBAR REGION: ICD-10-CM

## 2019-07-15 DIAGNOSIS — I50.32 CHRONIC DIASTOLIC CHF (CONGESTIVE HEART FAILURE) (HCC): ICD-10-CM

## 2019-07-15 LAB
DIFF CAP.CO: NORMAL ML/MMHG SEC
FEV1/FVC: NORMAL %
FEV1: NORMAL LITERS
FVC VOL RESPIRATORY: NORMAL LITERS
TLC: NORMAL LITERS

## 2019-07-15 PROCEDURE — 94010 BREATHING CAPACITY TEST: CPT | Performed by: INTERNAL MEDICINE

## 2019-07-15 PROCEDURE — 99214 OFFICE O/P EST MOD 30 MIN: CPT | Performed by: INTERNAL MEDICINE

## 2019-07-15 PROCEDURE — 94729 DIFFUSING CAPACITY: CPT | Performed by: INTERNAL MEDICINE

## 2019-07-15 PROCEDURE — 94727 GAS DIL/WSHOT DETER LNG VOL: CPT | Performed by: INTERNAL MEDICINE

## 2019-07-15 NOTE — PATIENT INSTRUCTIONS
I did advise the patient that she had some mild abnormalities of her pulmonary function studies likely related to her cardiac disease and her scoliosis of the spine.  She does complain of hoarseness and I recommend she follow-up with her primary care physician on this.  She is on oxygen therapy as needed during the day and at night and states she is rarely have to using it during the day at present.  I will see her back as needed.

## 2019-07-16 NOTE — PROGRESS NOTES
Subjective   Nita Mari is a 89 y.o. female.     Chief Complaint   Patient presents with   • pulmonary fibrosis      No My Sticky Note on file.    History of Present Illness   The patient is referred by Dr. Mackey for evaluation of dyspnea.  She also has had some problems with hypoxemia with exertional she states that recently this is not been a problem and she just wearing her oxygen at night.  She is coming by her .  She does have problems with hoarseness a lot of gas esophageal reflux issues.  I told her to follow-up with her primary care physician on this.  She has brought prior 2D echocardiogram and valvular heart disease and diastolic dysfunction.  She has ischemic heart disease with previous stent placement.  A chest CT done in January of this year at [facility] is reviewed.  She had some minimal areas of scarring and mild bronchial wall thickening.  Significant cardiomegaly was noted.  She does have some scoliosis of the spine.  Significant cardiomegaly was noted.  She also had evidence of a multinodular goiter.    Medical/Family/Social History   has a past medical history of Arthritis, Chronic diastolic CHF (congestive heart failure) (CMS/HCC) (1/30/2017), Chronic osteoarthritis (1/30/2017), Coronary arteriosclerosis (1/30/2017), Diastolic dysfunction (1/30/2017), Diffuse interstitial pulmonary fibrosis (CMS/HCC) (1/30/2017), Dyspnea (1/30/2017), Fibrosis of lung (CMS/HCC) (1/30/2017), GERD (gastroesophageal reflux disease) (1/30/2017), Heart disease, History of transfusion, Hyperlipidemia (1/30/2017), Hypertension, Low back pain (1/30/2017), Malignant neoplasm of skin (1/30/2017), Mitral valve regurgitation, Murmur (1/30/2017), Prerenal renal failure (1/30/2017), and Spinal stenosis of lumbar region (1/30/2017).   has a past surgical history that includes Appendectomy; Cholecystectomy; Hysterectomy; Colon surgery (05/06/2015); Mitral valve repair; Cardiac catheterization; and Coronary angioplasty  with stent.  family history includes COPD in her other; Diabetes in her other; Heart attack in her father; Heart disease in her other.   reports that she has never smoked. She has never used smokeless tobacco. She reports that she does not drink alcohol or use drugs.  Allergies   Allergen Reactions   • Aspirin Swelling   • Codeine    • Contrast Dye Unknown (See Comments)     Pt states its been a long time since she has taken   • Iodine Unknown (See Comments)     Pt states its been a long time since she has taken   • Lisinopril Unknown (See Comments)     Pt states its been a long time since she has taken   • Penicillins Unknown (See Comments)     Pt states its been a long time since she has taken     Medications    Current Outpatient Medications:   •  acetaminophen (TYLENOL) 325 MG tablet, Take 325 mg by mouth Every 6 (Six) Hours As Needed for Mild Pain ., Disp: , Rfl:   •  bethanechol (URECHOLINE) 25 MG tablet, Take 25 mg by mouth 2 (Two) Times a Day., Disp: , Rfl:   •  Calcium Carbonate-Vitamin D (CALCIUM 500 + D PO), Take 1 tablet by mouth Daily., Disp: , Rfl:   •  carvedilol (COREG) 6.25 MG tablet, Take 6.25 mg by mouth 2 (Two) Times a Day With Meals., Disp: , Rfl:   •  clopidogrel (PLAVIX) 75 MG tablet, Take 75 mg by mouth Daily., Disp: , Rfl:   •  furosemide (LASIX) 40 MG tablet, Take 20 mg by mouth 2 (Two) Times a Day., Disp: , Rfl:   •  gabapentin (NEURONTIN) 400 MG capsule, Take 400 mg by mouth Every Night., Disp: , Rfl:   •  isosorbide mononitrate (IMDUR) 30 MG 24 hr tablet, Take 1 tablet by mouth Daily., Disp: 90 tablet, Rfl: 3  •  isosorbide mononitrate (IMDUR) 30 MG 24 hr tablet, Take 1 tablet by mouth Daily., Disp: 30 tablet, Rfl: 2  •  nitroglycerin (NITROSTAT) 0.4 MG SL tablet, 1 under the tongue as needed for angina, may repeat q5mins for up three doses, Disp: 100 tablet, Rfl: 11  •  pantoprazole (PROTONIX) 40 MG EC tablet, Take 40 mg by mouth 2 (Two) Times a Day., Disp: , Rfl:   •  potassium  "chloride (K-DUR,KLOR-CON) 20 MEQ CR tablet, Take 20 mEq by mouth Daily., Disp: , Rfl:   •  pravastatin (PRAVACHOL) 20 MG tablet, Take 20 mg by mouth Daily., Disp: , Rfl:   •  raNITIdine (ZANTAC) 300 MG tablet, Take 300 mg by mouth 2 (Two) Times a Day., Disp: , Rfl:   •  sodium bicarbonate 650 MG tablet, Take 650 mg by mouth 2 (Two) Times a Day., Disp: , Rfl:   •  sucralfate (CARAFATE) 1 G tablet, Take 1 g by mouth 4 (Four) Times a Day., Disp: , Rfl:   •  traZODone (DESYREL) 100 MG tablet, Take 100 mg by mouth Every Night., Disp: , Rfl:     Review of Systems   Constitutional: Negative for chills and fever.   HENT: Positive for trouble swallowing and voice change. Negative for congestion.    Eyes: Negative for visual disturbance.   Respiratory: Positive for shortness of breath. Negative for cough.    Cardiovascular: Negative for chest pain.   Gastrointestinal: Negative for diarrhea, nausea and vomiting.   Genitourinary: Negative for difficulty urinating.   Musculoskeletal: Positive for arthralgias and back pain.   Skin: Negative for rash.   Neurological: Negative for dizziness and speech difficulty.   Hematological: Negative for adenopathy.   Psychiatric/Behavioral: The patient is not nervous/anxious.      Objective   /80   Pulse 58   Ht 152.4 cm (60\")   Wt 60.1 kg (132 lb 9.6 oz)   SpO2 94% Comment: RA  Breastfeeding? No   BMI 25.90 kg/m²   Physical Exam   Constitutional: She is oriented to person, place, and time. She appears well-developed and well-nourished.   HENT:   Head: Normocephalic and atraumatic.   Eyes: EOM are normal. Pupils are equal, round, and reactive to light.   Neck: Normal range of motion. Neck supple.   Cardiovascular: Normal rate and regular rhythm.   A 2/6 systolic murmur is heard along the left sternal border.   Pulmonary/Chest: Effort normal and breath sounds normal.   Abdominal: Soft.   Musculoskeletal: Normal range of motion. She exhibits deformity.   Scoliosis of the thoracic " spine is present.   Neurological: She is alert and oriented to person, place, and time.   Skin: Skin is warm and dry.   Psychiatric: She has a normal mood and affect.   Nursing note and vitals reviewed.          Pulmonary Functions Testing Results:  FEV1   Date Value Ref Range Status   07/15/2019 77% liters Final     FVC   Date Value Ref Range Status   07/15/2019 77% liters Final     FEV1/FVC   Date Value Ref Range Status   07/15/2019 73.18% % Final     TLC   Date Value Ref Range Status   07/15/2019 76% liters Final     DLCO   Date Value Ref Range Status   07/15/2019 57% ml/mmHg sec Final      My interpretation of PFT:  1.  Spirometry is consistent with a mild restrictive ventilatory defect with coexisting small airways disease.  2.  Lung volumes confirm a mild restrictive ventilatory defect.  3.  There is a moderate diffusion impairment which when corrected for alveolar volume is a mild diffusion impairment.  Assessment/Plan   Nita was seen today for pulmonary fibrosis.    Diagnoses and all orders for this visit:    Dyspnea, unspecified type  -     Pulmonary Function Test    Non-rheumatic mitral regurgitation    Chronic diastolic CHF (congestive heart failure) (CMS/HCC)    Restrictive lung disease    Other idiopathic scoliosis, thoracolumbar region      Patient's Body mass index is 25.9 kg/m². BMI is within normal parameters. No follow-up required..    I do suspect her restrictive lung disease which is mild relates to her scoliosis and her cardiac disease.  She had some minimal areas of scarring on her recent chest CT but no evidence of any diffuse interstitial disease and starting nothing to suggest a diffuse pulmonary fibrotic picture.  I do think her dyspnea relates to her cardiac disease.  With her hoarseness and history of a goiter and also history of some reflux issues I told her to follow-up with her primary care physician regarding a possible ENT referral.  She can return as needed.  Presently she is just  wearing oxygen at night and is not requiring during the day even with exertion.

## 2019-08-20 ENCOUNTER — OFFICE VISIT (OUTPATIENT)
Dept: OTOLARYNGOLOGY | Facility: CLINIC | Age: 84
End: 2019-08-20

## 2019-08-20 VITALS
HEART RATE: 60 BPM | WEIGHT: 132 LBS | SYSTOLIC BLOOD PRESSURE: 110 MMHG | HEIGHT: 60 IN | DIASTOLIC BLOOD PRESSURE: 50 MMHG | BODY MASS INDEX: 25.91 KG/M2 | TEMPERATURE: 97.4 F

## 2019-08-20 DIAGNOSIS — H90.3 SENSORINEURAL HEARING LOSS (SNHL) OF BOTH EARS: ICD-10-CM

## 2019-08-20 DIAGNOSIS — H93.13 TINNITUS OF BOTH EARS: ICD-10-CM

## 2019-08-20 DIAGNOSIS — J38.3 DYSPHONIA, SPASMODIC: Primary | ICD-10-CM

## 2019-08-20 DIAGNOSIS — K21.9 LARYNGOPHARYNGEAL REFLUX (LPR): ICD-10-CM

## 2019-08-20 DIAGNOSIS — K21.9 GASTROESOPHAGEAL REFLUX DISEASE, ESOPHAGITIS PRESENCE NOT SPECIFIED: ICD-10-CM

## 2019-08-20 PROCEDURE — 31575 DIAGNOSTIC LARYNGOSCOPY: CPT | Performed by: PHYSICIAN ASSISTANT

## 2019-08-20 PROCEDURE — 99214 OFFICE O/P EST MOD 30 MIN: CPT | Performed by: PHYSICIAN ASSISTANT

## 2019-08-20 NOTE — PATIENT INSTRUCTIONS
Continue reflux treatment and precautions, will see if home health can work with her voice. Recheck in 3 months.

## 2019-08-20 NOTE — PROGRESS NOTES
ALVAREZ Cheney     Chief Complaint   Patient presents with   • Voice change     and ears        HISTORY OF PRESENT ILLNESS:     Nita Mari is a  89 y.o.  female who complains of voice change. The symptoms are not localized to a particular location. The patient has had moderate to severe symptoms. The symptoms have been relatively constant for the last several months. The symptoms are aggravated by  no identifiable factors. The symptoms are improved by no identifiable factors.    The patient has chronic acid reflux symptoms that are very well controlled on current treatment plan. She also reports chronic/stable hearing loss and tinnitus.    Review of Systems   Constitutional: Negative for activity change, appetite change, chills, diaphoresis, fatigue, fever and unexpected weight change.   HENT: Positive for hearing loss, tinnitus and voice change. Negative for congestion, dental problem, drooling, ear discharge, ear pain, facial swelling, mouth sores, nosebleeds, postnasal drip, rhinorrhea, sinus pressure, sneezing, sore throat and trouble swallowing.    Eyes: Negative.    Respiratory: Negative.    Cardiovascular: Negative.    Gastrointestinal: Negative.         GERD   Endocrine: Negative.    Skin: Negative.    Allergic/Immunologic: Negative for environmental allergies, food allergies and immunocompromised state.   Neurological: Negative.    Hematological: Negative.    Psychiatric/Behavioral: Negative.    :    Past History:  Past Medical History:   Diagnosis Date   • Arthritis    • Chronic diastolic CHF (congestive heart failure) (CMS/HCC) 1/30/2017   • Chronic osteoarthritis 1/30/2017   • Coronary arteriosclerosis 1/30/2017   • Diastolic dysfunction 1/30/2017   • Diffuse interstitial pulmonary fibrosis (CMS/HCC) 1/30/2017   • Dyspnea 1/30/2017   • Fibrosis of lung (CMS/HCC) 1/30/2017   • GERD (gastroesophageal reflux disease) 1/30/2017   • Heart disease    • History of transfusion    •  Hyperlipidemia 1/30/2017   • Hypertension    • Low back pain 1/30/2017   • Malignant neoplasm of skin 1/30/2017   • Mitral valve regurgitation    • Murmur 1/30/2017   • Prerenal renal failure 1/30/2017   • Spinal stenosis of lumbar region 1/30/2017     Past Surgical History:   Procedure Laterality Date   • APPENDECTOMY     • CARDIAC CATHETERIZATION     • CHOLECYSTECTOMY     • COLON SURGERY  05/06/2015    POLYP REMOVAL   • CORONARY ANGIOPLASTY WITH STENT PLACEMENT      STENTS X3   • HYSTERECTOMY     • MITRAL VALVE REPAIR/REPLACEMENT      repair with MITRACLIP      Family History   Problem Relation Age of Onset   • Heart attack Father    • COPD Other    • Diabetes Other    • Heart disease Other      Social History     Tobacco Use   • Smoking status: Never Smoker   • Smokeless tobacco: Never Used   Substance Use Topics   • Alcohol use: No   • Drug use: No     Outpatient Medications Marked as Taking for the 8/20/19 encounter (Office Visit) with Enio Greene PA   Medication Sig Dispense Refill   • acetaminophen (TYLENOL) 325 MG tablet Take 325 mg by mouth Every 6 (Six) Hours As Needed for Mild Pain .     • bethanechol (URECHOLINE) 25 MG tablet Take 25 mg by mouth 2 (Two) Times a Day.     • Calcium Carbonate-Vitamin D (CALCIUM 500 + D PO) Take 1 tablet by mouth Daily.     • carvedilol (COREG) 6.25 MG tablet Take 6.25 mg by mouth 2 (Two) Times a Day With Meals.     • clopidogrel (PLAVIX) 75 MG tablet Take 75 mg by mouth Daily.     • furosemide (LASIX) 40 MG tablet Take 20 mg by mouth 2 (Two) Times a Day.     • gabapentin (NEURONTIN) 400 MG capsule Take 400 mg by mouth Every Night.     • isosorbide mononitrate (IMDUR) 30 MG 24 hr tablet Take 1 tablet by mouth Daily. 90 tablet 3   • isosorbide mononitrate (IMDUR) 30 MG 24 hr tablet Take 1 tablet by mouth Daily. 30 tablet 2   • nitroglycerin (NITROSTAT) 0.4 MG SL tablet 1 under the tongue as needed for angina, may repeat q5mins for up three doses 100 tablet 11   •  pantoprazole (PROTONIX) 40 MG EC tablet Take 40 mg by mouth 2 (Two) Times a Day.     • potassium chloride (K-DUR,KLOR-CON) 20 MEQ CR tablet Take 20 mEq by mouth Daily.     • pravastatin (PRAVACHOL) 20 MG tablet Take 20 mg by mouth Daily.     • raNITIdine (ZANTAC) 300 MG tablet Take 300 mg by mouth 2 (Two) Times a Day.     • sodium bicarbonate 650 MG tablet Take 650 mg by mouth 2 (Two) Times a Day.     • sucralfate (CARAFATE) 1 G tablet Take 1 g by mouth 4 (Four) Times a Day.     • traZODone (DESYREL) 100 MG tablet Take 100 mg by mouth Every Night.       Allergies:  Aspirin; Codeine; Contrast dye; Iodine; Lisinopril; and Penicillins          Vital Signs:   Vitals:    08/20/19 1050   BP: 110/50   Pulse: 60   Temp: 97.4 °F (36.3 °C)         EXAMINATION:   CONSTITUTIONAL: well nourished, alert, oriented, in no acute distress     COMMUNICATION AND VOICE: able to communicate normally, normal voice quality    HEAD: normocephalic, no lesions, atraumatic, no tenderness, no masses     FACE: appearance normal, no lesions, no tenderness, no deformities, facial motion symmetric    SALIVARY GLANDS: parotid glands with no tenderness, no swelling, no masses, submandibular glands with normal size, nontender    EYES: ocular motility normal, eyelids normal, orbits normal, no proptosis, conjunctiva normal , pupils equal, round     EARS:  Hearing: response to conversational voice normal bilaterally   External Ears: auricles without lesions  Otoscopic: tympanic membrane appearance normal, no lesions, no perforation, normal mobility, no fluid    NOSE:  External Nose: structure normal, no tenderness on palpation, no nasal discharge, no lesions, no evidence of trauma, nostrils patent   Intranasal Exam: nasal mucosa normal, vestibule within normal limits, inferior turbinate normal, nasal septum midline     ORAL:  Lips: upper and lower lips without lesion   Teeth: dentition within normal limits for age   Gums: gingivae healthy   Oral Mucosa:  oral mucosa normal, no mucosal lesions   Floor of Mouth: Warthin’s duct patent, mucosa normal  Tongue: lingual mucosa normal without lesions, normal tongue mobility   Palate: soft and hard palates with normal mucosa and structure  Oropharynx: oropharyngeal mucosa normal    NECK: neck appearance normal, no mass,  noted without erythema or tenderness    THYROID: no overt thyromegaly, no tenderness, nodules or mass present on palpation, position midline     LYMPH NODES: no lymphadenopathy    CHEST/RESPIRATORY: respiratory effort normal, normal breath sounds     CARDIOVASCULAR: rate and rhythm normal, extremities without cyanosis or edema      NEUROLOGIC/PSYCHIATRIC: oriented to time, place and person, mood normal, affect appropriate, CN II-XII intact grossly    OPERATIVE NOTE:  Nita Figueroa McNeely    DATE OF PROCEDURE: 08/20/2019    PROCEDURE:   Flexible Fiberoptic Laryngoscopy    ANESTHESIA:  None    REASON FOR PROCEDURE:  Procedure was recommend for persistant hoarseness with very little voice at times.  Risks, benefits and alternatives were discussed.      DETAILS of OPERATION:  The patient was seated in the exam chair.  A flexible fiberoptic laryngoscopy was performed through the oral cavity.  The scope was introduced into the oral cavity and directed to the level of the glottis, examining the structures of the oropharynx, base of tongue, vallecula, supraglottic larynx, glottic larynx, and hypopharynx.      PRE-OPERATIVE DIAGNOSIS:  spasmodic dysphonia    POST-OPERATIVE DIAGNOSIS:  Same    FINDINGS:  Mucosal surfaces:   The mucosal surfaces demonstrated normal mucosa surfaces without inflammation    Base of tongue:  The base of tongue was found to have no mass or lesion.    Epiglottis:  The epiglottis was found to have  no mass or lesion.    Aryepligottic fold:  The AE folds were found to have no mass or lesion.    False Vocal Fold:  The false cords were found to have no mass or lesion.    True Vocal Cord:  The true  vocal cords were found to have no mass or lesion. Bilateral adduction and abduction spasmodic movement.    Arytenoid:   The arytenoids were found to have no mass or lesion.    Hypopharynx:  The hypopharynx was found to have no mass or lesion.    The patient tolerated procedure well.          RESULTS REVIEW:    I have reviewed the patients old records in the chart.       Assessment    Diagnosis Plan   1. Dysphonia, spasmodic  Ambulatory Referral to Home Health   2. Gastroesophageal reflux disease, esophagitis presence not specified     3. Laryngopharyngeal reflux (LPR)     4. Sensorineural hearing loss (SNHL) of both ears     5. Tinnitus of both ears         Plan    Patient Instructions   Continue reflux treatment and precautions, will see if home health can work with her voice. Recheck in 3 months.       Orders Placed This Encounter   Procedures   • Ambulatory Referral to Home Health          Return in about 3 months (around 11/20/2019) for Recheck voice/throat.    ALVAREZ Cheney  08/20/19  1:01 PM

## 2019-08-27 ENCOUNTER — OFFICE VISIT (OUTPATIENT)
Dept: CARDIOLOGY | Facility: CLINIC | Age: 84
End: 2019-08-27

## 2019-08-27 VITALS
HEART RATE: 62 BPM | WEIGHT: 132 LBS | SYSTOLIC BLOOD PRESSURE: 138 MMHG | OXYGEN SATURATION: 95 % | HEIGHT: 62 IN | DIASTOLIC BLOOD PRESSURE: 52 MMHG | BODY MASS INDEX: 24.29 KG/M2

## 2019-08-27 DIAGNOSIS — R06.00 DYSPNEA, UNSPECIFIED TYPE: ICD-10-CM

## 2019-08-27 DIAGNOSIS — J38.3 DYSPHONIA, SPASMODIC: Primary | ICD-10-CM

## 2019-08-27 DIAGNOSIS — I25.10 CAD IN NATIVE ARTERY: ICD-10-CM

## 2019-08-27 DIAGNOSIS — I51.89 DIASTOLIC DYSFUNCTION: ICD-10-CM

## 2019-08-27 DIAGNOSIS — E78.2 MIXED HYPERLIPIDEMIA: ICD-10-CM

## 2019-08-27 DIAGNOSIS — I34.0 MITRAL VALVE INSUFFICIENCY, UNSPECIFIED ETIOLOGY: ICD-10-CM

## 2019-08-27 DIAGNOSIS — M48.50XA COMPRESSION FRACTURE OF VERTEBRAL COLUMN (HCC): ICD-10-CM

## 2019-08-27 PROCEDURE — 93000 ELECTROCARDIOGRAM COMPLETE: CPT | Performed by: INTERNAL MEDICINE

## 2019-08-27 PROCEDURE — 99214 OFFICE O/P EST MOD 30 MIN: CPT | Performed by: INTERNAL MEDICINE

## 2020-01-03 RX ORDER — ISOSORBIDE MONONITRATE 30 MG/1
30 TABLET, EXTENDED RELEASE ORAL DAILY
Qty: 90 TABLET | Refills: 3 | Status: SHIPPED | OUTPATIENT
Start: 2020-01-03 | End: 2021-06-24 | Stop reason: SDUPTHER

## 2020-02-05 ENCOUNTER — HOSPITAL ENCOUNTER (OUTPATIENT)
Dept: CARDIOLOGY | Facility: HOSPITAL | Age: 85
Discharge: HOME | End: 2020-02-05
Admitting: INTERNAL MEDICINE

## 2020-02-05 VITALS
HEIGHT: 62 IN | DIASTOLIC BLOOD PRESSURE: 52 MMHG | WEIGHT: 135 LBS | SYSTOLIC BLOOD PRESSURE: 138 MMHG | BODY MASS INDEX: 24.84 KG/M2

## 2020-02-05 DIAGNOSIS — I34.0 MITRAL VALVE INSUFFICIENCY, UNSPECIFIED ETIOLOGY: ICD-10-CM

## 2020-02-05 DIAGNOSIS — R06.00 DYSPNEA, UNSPECIFIED TYPE: ICD-10-CM

## 2020-02-05 PROCEDURE — 93306 TTE W/DOPPLER COMPLETE: CPT

## 2020-02-05 PROCEDURE — 93306 TTE W/DOPPLER COMPLETE: CPT | Performed by: INTERNAL MEDICINE

## 2020-02-07 LAB
BH CV ECHO MEAS - AO MAX PG (FULL): 5.9 MMHG
BH CV ECHO MEAS - AO MAX PG: 13 MMHG
BH CV ECHO MEAS - AO MEAN PG (FULL): 3.5 MMHG
BH CV ECHO MEAS - AO MEAN PG: 7.5 MMHG
BH CV ECHO MEAS - AO ROOT AREA (BSA CORRECTED): 1.9
BH CV ECHO MEAS - AO ROOT AREA: 7.1 CM^2
BH CV ECHO MEAS - AO ROOT DIAM: 3 CM
BH CV ECHO MEAS - AO V2 MAX: 180 CM/SEC
BH CV ECHO MEAS - AO V2 MEAN: 129 CM/SEC
BH CV ECHO MEAS - AO V2 VTI: 50 CM
BH CV ECHO MEAS - AVA(I,A): 2.1 CM^2
BH CV ECHO MEAS - AVA(I,D): 2.1 CM^2
BH CV ECHO MEAS - AVA(V,A): 2.3 CM^2
BH CV ECHO MEAS - AVA(V,D): 2.3 CM^2
BH CV ECHO MEAS - BSA(HAYCOCK): 1.6 M^2
BH CV ECHO MEAS - BSA: 1.6 M^2
BH CV ECHO MEAS - BZI_BMI: 24.7 KILOGRAMS/M^2
BH CV ECHO MEAS - BZI_METRIC_HEIGHT: 157.5 CM
BH CV ECHO MEAS - BZI_METRIC_WEIGHT: 61.2 KG
BH CV ECHO MEAS - EDV(CUBED): 54 ML
BH CV ECHO MEAS - EDV(MOD-SP4): 62.9 ML
BH CV ECHO MEAS - EDV(TEICH): 61.2 ML
BH CV ECHO MEAS - EF(CUBED): 76.9 %
BH CV ECHO MEAS - EF(MOD-SP4): 70.4 %
BH CV ECHO MEAS - EF(TEICH): 69.7 %
BH CV ECHO MEAS - ESV(CUBED): 12.5 ML
BH CV ECHO MEAS - ESV(MOD-SP4): 18.6 ML
BH CV ECHO MEAS - ESV(TEICH): 18.5 ML
BH CV ECHO MEAS - FS: 38.6 %
BH CV ECHO MEAS - IVS/LVPW: 0.94
BH CV ECHO MEAS - IVSD: 1 CM
BH CV ECHO MEAS - LA DIMENSION: 3.8 CM
BH CV ECHO MEAS - LA/AO: 1.3
BH CV ECHO MEAS - LAT PEAK E' VEL: 6.1 CM/SEC
BH CV ECHO MEAS - LV DIASTOLIC VOL/BSA (35-75): 38.9 ML/M^2
BH CV ECHO MEAS - LV MASS(C)D: 125.7 GRAMS
BH CV ECHO MEAS - LV MASS(C)DI: 77.7 GRAMS/M^2
BH CV ECHO MEAS - LV MAX PG: 7.1 MMHG
BH CV ECHO MEAS - LV MEAN PG: 4 MMHG
BH CV ECHO MEAS - LV SYSTOLIC VOL/BSA (12-30): 11.5 ML/M^2
BH CV ECHO MEAS - LV V1 MAX: 133 CM/SEC
BH CV ECHO MEAS - LV V1 MEAN: 85.6 CM/SEC
BH CV ECHO MEAS - LV V1 VTI: 33.9 CM
BH CV ECHO MEAS - LVIDD: 3.8 CM
BH CV ECHO MEAS - LVIDS: 2.3 CM
BH CV ECHO MEAS - LVLD AP4: 6.8 CM
BH CV ECHO MEAS - LVLS AP4: 5.4 CM
BH CV ECHO MEAS - LVOT AREA (M): 3.1 CM^2
BH CV ECHO MEAS - LVOT AREA: 3.1 CM^2
BH CV ECHO MEAS - LVOT DIAM: 2 CM
BH CV ECHO MEAS - LVPWD: 1.1 CM
BH CV ECHO MEAS - MED PEAK E' VEL: 4.8 CM/SEC
BH CV ECHO MEAS - MR MAX PG: 154.8 MMHG
BH CV ECHO MEAS - MR MAX VEL: 622 CM/SEC
BH CV ECHO MEAS - MR MEAN PG: 118 MMHG
BH CV ECHO MEAS - MR MEAN VEL: 518 CM/SEC
BH CV ECHO MEAS - MR VTI: 259 CM
BH CV ECHO MEAS - MV A MAX VEL: 118 CM/SEC
BH CV ECHO MEAS - MV DEC SLOPE: 319 CM/SEC^2
BH CV ECHO MEAS - MV DEC TIME: 0.5 SEC
BH CV ECHO MEAS - MV E MAX VEL: 162 CM/SEC
BH CV ECHO MEAS - MV E/A: 1.4
BH CV ECHO MEAS - MV P1/2T MAX VEL: 163 CM/SEC
BH CV ECHO MEAS - MV P1/2T: 149.7 MSEC
BH CV ECHO MEAS - MVA P1/2T LCG: 1.3 CM^2
BH CV ECHO MEAS - MVA(P1/2T): 1.5 CM^2
BH CV ECHO MEAS - RAP SYSTOLE: 5 MMHG
BH CV ECHO MEAS - RVSP: 49.9 MMHG
BH CV ECHO MEAS - SI(AO): 218.3 ML/M^2
BH CV ECHO MEAS - SI(CUBED): 25.7 ML/M^2
BH CV ECHO MEAS - SI(LVOT): 65.8 ML/M^2
BH CV ECHO MEAS - SI(MOD-SP4): 27.4 ML/M^2
BH CV ECHO MEAS - SI(TEICH): 26.4 ML/M^2
BH CV ECHO MEAS - SV(AO): 353.1 ML
BH CV ECHO MEAS - SV(CUBED): 41.5 ML
BH CV ECHO MEAS - SV(LVOT): 106.5 ML
BH CV ECHO MEAS - SV(MOD-SP4): 44.3 ML
BH CV ECHO MEAS - SV(TEICH): 42.7 ML
BH CV ECHO MEAS - TR MAX VEL: 335 CM/SEC
BH CV ECHO MEASUREMENTS AVERAGE E/E' RATIO: 29.72
LEFT ATRIUM VOLUME INDEX: 34 ML/M2
LEFT ATRIUM VOLUME: 55.1 CM3
LV EF 2D ECHO EST: 65 %
MAXIMAL PREDICTED HEART RATE: 129 BPM
STRESS TARGET HR: 110 BPM

## 2020-05-27 ENCOUNTER — OFFICE VISIT (OUTPATIENT)
Dept: CARDIOLOGY | Facility: CLINIC | Age: 85
End: 2020-05-27

## 2020-05-27 VITALS
SYSTOLIC BLOOD PRESSURE: 117 MMHG | BODY MASS INDEX: 24.66 KG/M2 | DIASTOLIC BLOOD PRESSURE: 70 MMHG | HEART RATE: 51 BPM | WEIGHT: 134 LBS | HEIGHT: 62 IN

## 2020-05-27 DIAGNOSIS — I95.9 SYMPTOMATIC HYPOTENSION: ICD-10-CM

## 2020-05-27 DIAGNOSIS — J84.10 DIFFUSE INTERSTITIAL PULMONARY FIBROSIS (HCC): ICD-10-CM

## 2020-05-27 DIAGNOSIS — R07.2 PRECORDIAL CHEST PAIN: ICD-10-CM

## 2020-05-27 DIAGNOSIS — I34.0 NONRHEUMATIC MITRAL VALVE REGURGITATION: ICD-10-CM

## 2020-05-27 DIAGNOSIS — I10 ESSENTIAL HYPERTENSION: ICD-10-CM

## 2020-05-27 DIAGNOSIS — I25.10 CAD IN NATIVE ARTERY: ICD-10-CM

## 2020-05-27 DIAGNOSIS — R05.9 COUGH: ICD-10-CM

## 2020-05-27 DIAGNOSIS — I51.89 DIASTOLIC DYSFUNCTION: ICD-10-CM

## 2020-05-27 DIAGNOSIS — I50.32 CHRONIC DIASTOLIC CHF (CONGESTIVE HEART FAILURE) (HCC): ICD-10-CM

## 2020-05-27 DIAGNOSIS — E78.2 MIXED HYPERLIPIDEMIA: Primary | ICD-10-CM

## 2020-05-27 DIAGNOSIS — K21.9 GASTROESOPHAGEAL REFLUX DISEASE WITHOUT ESOPHAGITIS: ICD-10-CM

## 2020-05-27 DIAGNOSIS — R06.00 DYSPNEA, UNSPECIFIED TYPE: ICD-10-CM

## 2020-05-27 DIAGNOSIS — M19.90 CHRONIC OSTEOARTHRITIS: ICD-10-CM

## 2020-05-27 PROCEDURE — 99442 PR PHYS/QHP TELEPHONE EVALUATION 11-20 MIN: CPT | Performed by: INTERNAL MEDICINE

## 2020-05-27 RX ORDER — BENZONATATE 100 MG/1
100 CAPSULE ORAL 3 TIMES DAILY PRN
Qty: 90 CAPSULE | Refills: 3 | Status: SHIPPED | OUTPATIENT
Start: 2020-05-27

## 2020-05-27 NOTE — PROGRESS NOTES
Nita Mari  [id number]  10/20/1928  89 y.o.  female        This is a telephonic visit  You have chosen to receive care through a telephone visit. Do you consent to use a telephone visit for your medical care today? Yes      Referring Provider: Shahid Myers, DO    Reason for  Visit:     Routine virtual visit using above modality   coronary artery disease   stented coronary artery   Mild to moderate mitral regurgitation on last echo 1/19 [facility]   Christina clip in past   Cardiac workup test results as below       Subjective      Similar symptoms as during last visit   Overall the patient feels no major change from baseline symptoms   No new events or complaints since last visit   Low risk nuclear stress test with normal LVEF      Moderate to severe exertional shortness of breath on exertion relieved with rest  Moderate cough   No wheezing  Going on for several months     No palpitations  No associated chest pain  No significant pedal edema    No fever or chills  No significant expectoration    No hemoptysis  No presyncope or syncope     Occasional non exertional chest pain now better, she ascribes this to acid reflux    Joint pain in small, medium and large joints   Chronic low back pain      History of present illness:  Nita Mari is a 89 y.o. yo female with history of congestive heart failure mitral regurgitation s/p christina clip who presents today for   No chief complaint on file.  .    History  Past Medical History:   Diagnosis Date   • Arthritis    • Chronic diastolic CHF (congestive heart failure) (CMS/HCC) 1/30/2017   • Chronic osteoarthritis 1/30/2017   • Coronary arteriosclerosis 1/30/2017   • Diastolic dysfunction 1/30/2017   • Diffuse interstitial pulmonary fibrosis (CMS/HCC) 1/30/2017   • Dyspnea 1/30/2017   • Fibrosis of lung (CMS/HCC) 1/30/2017   • GERD (gastroesophageal reflux disease) 1/30/2017   • Heart disease    • History of transfusion    •  Hyperlipidemia 1/30/2017   • Hypertension    • Low back pain 1/30/2017   • Malignant neoplasm of skin 1/30/2017   • Mitral valve regurgitation    • Murmur 1/30/2017   • Prerenal renal failure 1/30/2017   • Spinal stenosis of lumbar region 1/30/2017   ,   Past Surgical History:   Procedure Laterality Date   • APPENDECTOMY     • CARDIAC CATHETERIZATION     • CHOLECYSTECTOMY     • COLON SURGERY  05/06/2015    POLYP REMOVAL   • CORONARY ANGIOPLASTY WITH STENT PLACEMENT      STENTS X3   • HYSTERECTOMY     • MITRAL VALVE REPAIR/REPLACEMENT      repair with MITRACLIP    ,   Family History   Problem Relation Age of Onset   • Heart attack Father    • COPD Other    • Diabetes Other    • Heart disease Other    ,   Social History     Tobacco Use   • Smoking status: Never Smoker   • Smokeless tobacco: Never Used   Substance Use Topics   • Alcohol use: No   • Drug use: No   ,     Medications  Current Outpatient Medications   Medication Sig Dispense Refill   • acetaminophen (TYLENOL) 325 MG tablet Take 325 mg by mouth Every 6 (Six) Hours As Needed for Mild Pain .     • benzonatate (Tessalon Perles) 100 MG capsule Take 1 capsule by mouth 3 (Three) Times a Day As Needed for Cough. 90 capsule 3   • bethanechol (URECHOLINE) 25 MG tablet Take 25 mg by mouth 2 (Two) Times a Day.     • Calcium Carbonate-Vitamin D (CALCIUM 500 + D PO) Take 1 tablet by mouth Daily.     • carvedilol (COREG) 6.25 MG tablet Take 6.25 mg by mouth 2 (Two) Times a Day With Meals.     • clopidogrel (PLAVIX) 75 MG tablet Take 75 mg by mouth Daily.     • furosemide (LASIX) 40 MG tablet Take 20 mg by mouth 2 (Two) Times a Day.     • gabapentin (NEURONTIN) 400 MG capsule Take 400 mg by mouth Every Night.     • isosorbide mononitrate (IMDUR) 30 MG 24 hr tablet Take 1 tablet by mouth Daily. 90 tablet 3   • isosorbide mononitrate (IMDUR) 30 MG 24 hr tablet TAKE 1 TABLET BY MOUTH DAILY. 90 tablet 3   • nitroglycerin (NITROSTAT) 0.4 MG SL tablet 1 under the tongue as  "needed for angina, may repeat q5mins for up three doses 100 tablet 11   • pantoprazole (PROTONIX) 40 MG EC tablet Take 40 mg by mouth 2 (Two) Times a Day.     • potassium chloride (K-DUR,KLOR-CON) 20 MEQ CR tablet Take 20 mEq by mouth Daily.     • pravastatin (PRAVACHOL) 20 MG tablet Take 20 mg by mouth Daily.     • raNITIdine (ZANTAC) 300 MG tablet Take 300 mg by mouth 2 (Two) Times a Day.     • sodium bicarbonate 650 MG tablet Take 650 mg by mouth 2 (Two) Times a Day.     • sucralfate (CARAFATE) 1 G tablet Take 1 g by mouth 4 (Four) Times a Day.     • traZODone (DESYREL) 100 MG tablet Take 100 mg by mouth Every Night.       No current facility-administered medications for this visit.        Allergies:  Aspirin; Codeine; Contrast dye; Iodine; Lisinopril; and Penicillins    Review of Systems  Review of Systems   Constitution: Positive for malaise/fatigue.   HENT: Negative.    Eyes: Negative.    Cardiovascular: Positive for chest pain and dyspnea on exertion. Negative for claudication, cyanosis, irregular heartbeat, leg swelling, near-syncope, orthopnea, palpitations, paroxysmal nocturnal dyspnea and syncope.   Respiratory: Positive for cough and shortness of breath.    Endocrine: Negative.    Hematologic/Lymphatic: Negative.    Skin: Negative.    Musculoskeletal: Positive for arthritis and back pain.   Gastrointestinal: Negative for anorexia.   Genitourinary: Negative.    Neurological: Positive for weakness.   Psychiatric/Behavioral: Negative.        Objective     Physical Exam:  /70   Pulse 51   Ht 157.5 cm (62\")   Wt 60.8 kg (134 lb)   BMI 24.51 kg/m²     Self reported vitals above as available      • General demeanor : Patient is calm and composed.  There is no distress.  Patient can converse normally    • Description of patient's judgment and insight: Normal  • Brief assessment of mental status, which may include:  - Orientation to time, place, and person  - Recent and remote memory intact  - Mood and " affect is normal  • Per patient no problems with vision or eyes that is new  • Per self examination of patient no issues with her ears or nose or gums or teeth  • Assessment of hearing: Normal  • self examination of patient no reported mass in her neck    • Patient can carry out her normal speech and does not have any respiratory difficulties  • Per self examination of patient no significant lower extremity edema  • Per self examination of patient no reported skin issues that is new  • Per self-examination gait is normal and unchanged from baseline   • Per self examination of patient digits and nails are normal without any cyanosis or swelling  • Per self examination of patient no issues with joints or bones that is new  • Assessment of range of motion with notation of any pain, crepitation or contracture      Results Review:      Results for orders placed during the hospital encounter of 02/05/20   Adult Transthoracic Echo Complete W/ Cont if Necessary Per Protocol    Narrative · Estimated EF = 65%.  · Left ventricular systolic function is normal.  · Left ventricular diastolic dysfunction.  · Mild to moderate tricuspid valve regurgitation is present.  · Mild mitral valve regurgitation is present  · Moderate pulmonary hypertension is present.           2014 Dr Broadbent    HEMODYNAMICS:  1.  LV pressure 130/16.   2.  No gradient across the aortic valve.   3.  Left ventriculography ejection fraction 60%.  No identifiable wall motion  abnormality.      IMPRESSION:  1.  Widely patent.  (Previously placed stents in both the proximal and  midportion of the left anterior descending).  2.  Mild to moderate diffuse coronary plaque, at this point left to be treated  medically.   3.  Preserved left ventricular systolic function.   4.  No significant aortic valve stenosis or mitral valve regurgitation.       myocardial perfusion scan   3/4/19        · Myocardial perfusion imaging indicates a medium-sized infarct located in the  anterior wall and lateral wall with no significant ischemia noted.  · Left ventricular ejection fraction is hyperdynamic (Calculated EF > 70%).  · Breast attenuation artifact is present.          Procedures    Assessment/Plan   Diagnoses and all orders for this visit:    Mixed hyperlipidemia    Essential hypertension    CAD in native artery    Chronic diastolic CHF (congestive heart failure) (CMS/HCC)    Diastolic dysfunction    Diffuse interstitial pulmonary fibrosis (CMS/HCC)    Gastroesophageal reflux disease without esophagitis    Chronic osteoarthritis    Dyspnea, unspecified type    Symptomatic hypotension    Nonrheumatic mitral valve regurgitation    Precordial chest pain    Other orders  -     benzonatate (Tessalon Perles) 100 MG capsule; Take 1 capsule by mouth 3 (Three) Times a Day As Needed for Cough.           Plan     Recommend continous and just not nightly oxygen therapy as as resting oxygen saturation is 96% .   Previous visit desaturation with light exertion to 72%. 99% with 2 LPM supplemental oxygen in recovery on exertion.        S/L NTG PRN for chest pain, call me or go to ER if has to use S/L nitroglycerin     Requested Prescriptions     Signed Prescriptions Disp Refills   • benzonatate (Tessalon Perles) 100 MG capsule 90 capsule 3     Sig: Take 1 capsule by mouth 3 (Three) Times a Day As Needed for Cough.      Orders Placed This Encounter   Procedures   • Ambulatory Referral to Pulmonology     Referral Priority:   Routine     Referral Type:   Consultation     Referral Reason:   Specialty Services Required     Requested Specialty:   Pulmonary Disease     Number of Visits Requested:   1      Total time spent on telephone with discussions planning 11  minutes and 24 seconds      ____________________________________________________________________________________________________________________________________________  Health maintenance and recommendations      Similar recommendations as last  visit      Offered to give patient  a copy of my notes       Questions were encouraged, asked and answered to the patient's  understanding and satisfaction. Questions if any regarding current medications and side effects, need for refills and importance of compliance to medications stressed.    Reviewed available prior notes, consults, prior visits, laboratory findings, radiology and cardiology relevant reports. Updated chart as applicable. I have reviewed the patient's medical history in detail and updated the computerized patient record as relevant.      Updated patient regarding any new or relevant abnormalities on review of records or any new findings on physical exam. Mentioned to patient about purpose of visit and desirable health short and long term goals and objectives.    Primary to monitor CBC CMP Lipid panel and TSH as applicable    ___________________________________________________________________________________________________________________________________________          Return in about 4 weeks (around 6/24/2020).

## 2020-06-27 ENCOUNTER — OFFICE VISIT (OUTPATIENT)
Dept: CARDIOLOGY | Facility: CLINIC | Age: 85
End: 2020-06-27

## 2020-06-27 VITALS
BODY MASS INDEX: 24.66 KG/M2 | SYSTOLIC BLOOD PRESSURE: 126 MMHG | DIASTOLIC BLOOD PRESSURE: 65 MMHG | WEIGHT: 134 LBS | HEART RATE: 55 BPM | HEIGHT: 62 IN

## 2020-06-27 DIAGNOSIS — I50.32 CHRONIC DIASTOLIC CHF (CONGESTIVE HEART FAILURE) (HCC): ICD-10-CM

## 2020-06-27 DIAGNOSIS — M19.90 CHRONIC OSTEOARTHRITIS: ICD-10-CM

## 2020-06-27 DIAGNOSIS — I25.10 CAD IN NATIVE ARTERY: Primary | ICD-10-CM

## 2020-06-27 DIAGNOSIS — R06.00 DYSPNEA, UNSPECIFIED TYPE: ICD-10-CM

## 2020-06-27 DIAGNOSIS — K21.9 GASTROESOPHAGEAL REFLUX DISEASE WITHOUT ESOPHAGITIS: ICD-10-CM

## 2020-06-27 DIAGNOSIS — I10 ESSENTIAL HYPERTENSION: ICD-10-CM

## 2020-06-27 DIAGNOSIS — J84.10 DIFFUSE INTERSTITIAL PULMONARY FIBROSIS (HCC): ICD-10-CM

## 2020-06-27 PROCEDURE — 99441 PR PHYS/QHP TELEPHONE EVALUATION 5-10 MIN: CPT | Performed by: INTERNAL MEDICINE

## 2020-06-27 NOTE — PROGRESS NOTES
Nita Mari  [id number]  10/20/1928  89 y.o.  female        This is a telephonic visit  You have chosen to receive care through a telephone visit. Do you consent to use a telephone visit for your medical care today? Yes      Referring Provider: Shahid Myers, DO    Reason for  Visit:     Routine virtual visit using above modality   short term office follow up after recent encounter   coronary artery disease   stented coronary artery   Mild to moderate mitral regurgitation on last echo 1/19 [facility]   Christina clip in past   Cardiac workup test results as below       Subjective    Overall feels well    No new events or complaints since last visit except cough has improved  Overall the patient feels no major change from baseline symptoms   Similar symptoms as during last visit     Tolerating current medications well with no untoward side effects   Compliant with prescribed medication regimen. Tries to adhere to cardiac diet.      Low risk nuclear stress test with normal LVEF      Moderate to severe exertional shortness of breath on exertion relieved with rest  Moderate cough in past now much better on Tessalon Perles TID PRN   No wheezing  Going on for several months     No palpitations  No associated chest pain  No significant pedal edema    No fever or chills  No significant expectoration    No hemoptysis  No presyncope or syncope     Occasional non exertional chest pain now better, she ascribes this to acid reflux    Joint pain in small, medium and large joints   Chronic low back pain      History of present illness:  Nita Mari is a 89 y.o. yo female with history of congestive heart failure mitral regurgitation s/p christina clip who presents today for   Chief Complaint   Patient presents with   • Follow-up   .    History  Past Medical History:   Diagnosis Date   • Arthritis    • Chronic diastolic CHF (congestive heart failure) (CMS/[facility]) 1/30/2017   • Chronic osteoarthritis  1/30/2017   • Coronary arteriosclerosis 1/30/2017   • Diastolic dysfunction 1/30/2017   • Diffuse interstitial pulmonary fibrosis (CMS/HCC) 1/30/2017   • Dyspnea 1/30/2017   • Fibrosis of lung (CMS/HCC) 1/30/2017   • GERD (gastroesophageal reflux disease) 1/30/2017   • Heart disease    • History of transfusion    • Hyperlipidemia 1/30/2017   • Hypertension    • Low back pain 1/30/2017   • Malignant neoplasm of skin 1/30/2017   • Mitral valve regurgitation    • Murmur 1/30/2017   • Prerenal renal failure 1/30/2017   • Spinal stenosis of lumbar region 1/30/2017   ,   Past Surgical History:   Procedure Laterality Date   • APPENDECTOMY     • CARDIAC CATHETERIZATION     • CHOLECYSTECTOMY     • COLON SURGERY  05/06/2015    POLYP REMOVAL   • CORONARY ANGIOPLASTY WITH STENT PLACEMENT      STENTS X3   • HYSTERECTOMY     • MITRAL VALVE REPAIR/REPLACEMENT      repair with MITRACLIP    ,   Family History   Problem Relation Age of Onset   • Heart attack Father    • COPD Other    • Diabetes Other    • Heart disease Other    ,   Social History     Tobacco Use   • Smoking status: Never Smoker   • Smokeless tobacco: Never Used   Substance Use Topics   • Alcohol use: No   • Drug use: No   ,     Medications  Current Outpatient Medications   Medication Sig Dispense Refill   • acetaminophen (TYLENOL) 325 MG tablet Take 325 mg by mouth Every 6 (Six) Hours As Needed for Mild Pain .     • benzonatate (Tessalon Perles) 100 MG capsule Take 1 capsule by mouth 3 (Three) Times a Day As Needed for Cough. 90 capsule 3   • bethanechol (URECHOLINE) 25 MG tablet Take 25 mg by mouth 2 (Two) Times a Day.     • Calcium Carbonate-Vitamin D (CALCIUM 500 + D PO) Take 1 tablet by mouth Daily.     • carvedilol (COREG) 6.25 MG tablet Take 6.25 mg by mouth 2 (Two) Times a Day With Meals.     • clopidogrel (PLAVIX) 75 MG tablet Take 75 mg by mouth Daily.     • furosemide (LASIX) 40 MG tablet Take 20 mg by mouth 2 (Two) Times a Day.     • gabapentin (NEURONTIN)  "400 MG capsule Take 400 mg by mouth Every Night.     • isosorbide mononitrate (IMDUR) 30 MG 24 hr tablet Take 1 tablet by mouth Daily. 90 tablet 3   • isosorbide mononitrate (IMDUR) 30 MG 24 hr tablet TAKE 1 TABLET BY MOUTH DAILY. 90 tablet 3   • nitroglycerin (NITROSTAT) 0.4 MG SL tablet 1 under the tongue as needed for angina, may repeat q5mins for up three doses 100 tablet 11   • pantoprazole (PROTONIX) 40 MG EC tablet Take 40 mg by mouth 2 (Two) Times a Day.     • potassium chloride (K-DUR,KLOR-CON) 20 MEQ CR tablet Take 20 mEq by mouth Daily.     • pravastatin (PRAVACHOL) 20 MG tablet Take 20 mg by mouth Daily.     • raNITIdine (ZANTAC) 300 MG tablet Take 300 mg by mouth 2 (Two) Times a Day.     • sodium bicarbonate 650 MG tablet Take 650 mg by mouth 2 (Two) Times a Day.     • sucralfate (CARAFATE) 1 G tablet Take 1 g by mouth 4 (Four) Times a Day.     • traZODone (DESYREL) 100 MG tablet Take 100 mg by mouth Every Night.       No current facility-administered medications for this visit.        Allergies:  Aspirin; Codeine; Contrast dye; Iodine; Lisinopril; and Penicillins    Review of Systems  Review of Systems   Constitution: Positive for malaise/fatigue.   HENT: Negative.    Eyes: Negative.    Cardiovascular: Positive for chest pain and dyspnea on exertion. Negative for claudication, cyanosis, irregular heartbeat, leg swelling, near-syncope, orthopnea, palpitations, paroxysmal nocturnal dyspnea and syncope.   Respiratory: Positive for cough and shortness of breath.    Endocrine: Negative.    Hematologic/Lymphatic: Negative.    Skin: Negative.    Musculoskeletal: Positive for arthritis and back pain.   Gastrointestinal: Negative for anorexia.   Genitourinary: Negative.    Neurological: Positive for weakness.   Psychiatric/Behavioral: Negative.        Objective     Physical Exam:  /65   Pulse 55   Ht 157.5 cm (62\")   Wt 60.8 kg (134 lb)   BMI 24.51 kg/m²     Self reported vitals above as available  "     • General demeanor : Patient is calm and composed.  There is no distress.  Patient can converse normally    • Description of patient's judgment and insight: Normal  • Brief assessment of mental status, which may include:  - Orientation to time, place, and person  - Recent and remote memory intact  - Mood and affect is normal  • Per patient no problems with vision or eyes that is new  • Per self examination of patient no issues with her ears or nose or gums or teeth  • Assessment of hearing: Normal  • self examination of patient no reported mass in her neck    • Patient can carry out her normal speech and does not have any respiratory difficulties  • Per self examination of patient no significant lower extremity edema  • Per self examination of patient no reported skin issues that is new  • Per self-examination gait is normal and unchanged from baseline   • Per self examination of patient digits and nails are normal without any cyanosis or swelling  • Per self examination of patient no issues with joints or bones that is new  • Assessment of range of motion with notation of any pain, crepitation or contracture      Results Review:      Results for orders placed during the hospital encounter of 02/05/20   Adult Transthoracic Echo Complete W/ Cont if Necessary Per Protocol    Narrative · Estimated EF = 65%.  · Left ventricular systolic function is normal.  · Left ventricular diastolic dysfunction.  · Mild to moderate tricuspid valve regurgitation is present.  · Mild mitral valve regurgitation is present  · Moderate pulmonary hypertension is present.           2014 Dr Broadbent    HEMODYNAMICS:  1.  LV pressure 130/16.   2.  No gradient across the aortic valve.   3.  Left ventriculography ejection fraction 60%.  No identifiable wall motion  abnormality.      IMPRESSION:  1.  Widely patent.  (Previously placed stents in both the proximal and  midportion of the left anterior descending).  2.  Mild to moderate diffuse  coronary plaque, at this point left to be treated  medically.   3.  Preserved left ventricular systolic function.   4.  No significant aortic valve stenosis or mitral valve regurgitation.       myocardial perfusion scan   3/4/19        · Myocardial perfusion imaging indicates a medium-sized infarct located in the anterior wall and lateral wall with no significant ischemia noted.  · Left ventricular ejection fraction is hyperdynamic (Calculated EF > 70%).  · Breast attenuation artifact is present.          Procedures    Assessment/Plan   Nita was seen today for follow-up.    Diagnoses and all orders for this visit:    CAD in native artery    Chronic diastolic CHF (congestive heart failure) (CMS/HCC)    Chronic osteoarthritis    Diffuse interstitial pulmonary fibrosis (CMS/HCC)    Dyspnea, unspecified type    Essential hypertension    Gastroesophageal reflux disease without esophagitis            ____________________________________________________________________________________________________________________________________________  Health maintenance and recommendations      Similar recommendations as last visit      Offered to give patient  a copy of my notes       Questions were encouraged, asked and answered to the patient's  understanding and satisfaction. Questions if any regarding current medications and side effects, need for refills and importance of compliance to medications stressed.    Reviewed available prior notes, consults, prior visits, laboratory findings, radiology and cardiology relevant reports. Updated chart as applicable. I have reviewed the patient's medical history in detail and updated the computerized patient record as relevant.      Updated patient regarding any new or relevant abnormalities on review of records or any new findings on physical exam. Mentioned to patient about purpose of visit and desirable health short and long term goals and objectives.    Primary to monitor CBC CMP  Lipid panel and TSH as applicable    ___________________________________________________________________________________________________________________________________________       Plan     Continue continous and just not nightly oxygen therapy as as resting oxygen saturation is 96% .   Previous visit desaturation with light exertion to 72%. 99% with 2 LPM supplemental oxygen in recovery on exertion.        S/L NTG PRN for chest pain, call me or go to ER if has to use S/L nitroglycerin      Due to see Dr Brand 7/20/20     Total time spent on telephone with discussions planning 7  minutes and 12 seconds            Return in about 6 months (around 12/27/2020).

## 2020-12-07 NOTE — PROGRESS NOTES
Nita Mari  [id number]  10/20/1928  89 y.o.  female    Referring Provider: Shahid Myers DO    Reason for  Visit:  Initial visit for  shortness of breath   coronary artery disease   stented coronary artery   Mild to moderate mitral regurgitation on last echo 1/19 [facility]   Christina clip in past       Subjective      Similar symptoms as during last visit   Overall the patient feels no major change from baseline symptoms   No new events or complaints since last visit   Low risk nuclear stress test with normal LVEF      Moderate to severe exertional shortness of breath on exertion relieved with rest  No significant cough or wheezing  Going on for several months or longer    No palpitations  No associated chest pain  No significant pedal edema    No fever or chills  No significant expectoration    No hemoptysis  No presyncope or syncope     Occasional non exertional chest pain now better, she ascribes this to acid reflux      Joint pain in small, medium and large joints   Chronic low back pain      History of present illness:  Nita Mari is a 89 y.o. yo female with history of congestive heart failure mitral regurgitation s/p christina clip who presents today for   Chief Complaint   Patient presents with   • Shortness of Breath     3 MONTH FOLLOW UP    .    History  Past Medical History:   Diagnosis Date   • Arthritis    • Chronic diastolic CHF (congestive heart failure) (CMS/HCC) 1/30/2017   • Chronic osteoarthritis 1/30/2017   • Coronary arteriosclerosis 1/30/2017   • Diastolic dysfunction 1/30/2017   • Diffuse interstitial pulmonary fibrosis (CMS/HCC) 1/30/2017   • Dyspnea 1/30/2017   • Fibrosis of lung (CMS/HCC) 1/30/2017   • GERD (gastroesophageal reflux disease) 1/30/2017   • Heart disease    • History of transfusion    • Hyperlipidemia 1/30/2017   • Hypertension    • Low back pain 1/30/2017   • Malignant neoplasm of skin 1/30/2017   • Mitral valve regurgitation    •  Patient takes trazodone at home nightly     Murmur 1/30/2017   • Prerenal renal failure 1/30/2017   • Spinal stenosis of lumbar region 1/30/2017   ,   Past Surgical History:   Procedure Laterality Date   • APPENDECTOMY     • CARDIAC CATHETERIZATION     • CHOLECYSTECTOMY     • COLON SURGERY  05/06/2015    POLYP REMOVAL   • CORONARY ANGIOPLASTY WITH STENT PLACEMENT      STENTS X3   • HYSTERECTOMY     • MITRAL VALVE REPAIR/REPLACEMENT      repair with MITRACLIP    ,   Family History   Problem Relation Age of Onset   • Heart attack Father    • COPD Other    • Diabetes Other    • Heart disease Other    ,   Social History     Tobacco Use   • Smoking status: Never Smoker   • Smokeless tobacco: Never Used   Substance Use Topics   • Alcohol use: No   • Drug use: No   ,     Medications  Current Outpatient Medications   Medication Sig Dispense Refill   • acetaminophen (TYLENOL) 325 MG tablet Take 325 mg by mouth Every 6 (Six) Hours As Needed for Mild Pain .     • bethanechol (URECHOLINE) 25 MG tablet Take 25 mg by mouth 2 (Two) Times a Day.     • Calcium Carbonate-Vitamin D (CALCIUM 500 + D PO) Take 1 tablet by mouth Daily.     • carvedilol (COREG) 6.25 MG tablet Take 6.25 mg by mouth 2 (Two) Times a Day With Meals.     • clopidogrel (PLAVIX) 75 MG tablet Take 75 mg by mouth Daily.     • furosemide (LASIX) 40 MG tablet Take 20 mg by mouth 2 (Two) Times a Day.     • gabapentin (NEURONTIN) 400 MG capsule Take 400 mg by mouth Every Night.     • isosorbide mononitrate (IMDUR) 30 MG 24 hr tablet Take 1 tablet by mouth Daily. 90 tablet 3   • isosorbide mononitrate (IMDUR) 30 MG 24 hr tablet Take 1 tablet by mouth Daily. 30 tablet 2   • nitroglycerin (NITROSTAT) 0.4 MG SL tablet 1 under the tongue as needed for angina, may repeat q5mins for up three doses 100 tablet 11   • pantoprazole (PROTONIX) 40 MG EC tablet Take 40 mg by mouth 2 (Two) Times a Day.     • potassium chloride (K-DUR,KLOR-CON) 20 MEQ CR tablet Take 20 mEq by mouth Daily.     • pravastatin (PRAVACHOL) 20 MG  "tablet Take 20 mg by mouth Daily.     • raNITIdine (ZANTAC) 300 MG tablet Take 300 mg by mouth 2 (Two) Times a Day.     • sodium bicarbonate 650 MG tablet Take 650 mg by mouth 2 (Two) Times a Day.     • sucralfate (CARAFATE) 1 G tablet Take 1 g by mouth 4 (Four) Times a Day.     • traZODone (DESYREL) 100 MG tablet Take 100 mg by mouth Every Night.       No current facility-administered medications for this visit.        Allergies:  Aspirin; Codeine; Contrast dye; Iodine; Lisinopril; and Penicillins    Review of Systems  Review of Systems   Constitution: Positive for weakness and malaise/fatigue.   HENT: Negative.    Eyes: Negative.    Cardiovascular: Positive for chest pain and dyspnea on exertion. Negative for claudication, cyanosis, irregular heartbeat, leg swelling, near-syncope, orthopnea, palpitations, paroxysmal nocturnal dyspnea and syncope.   Respiratory: Negative.    Endocrine: Negative.    Hematologic/Lymphatic: Negative.    Skin: Negative.    Musculoskeletal: Positive for arthritis and back pain.   Gastrointestinal: Negative for anorexia.   Genitourinary: Negative.    Psychiatric/Behavioral: Negative.        Objective     Physical Exam:  /52 (BP Location: Left arm, Patient Position: Sitting, Cuff Size: Adult)   Pulse 62   Ht 157.5 cm (62\")   Wt 59.9 kg (132 lb)   SpO2 95%   BMI 24.14 kg/m²     Physical Exam   Constitutional: She appears well-developed.   HENT:   Head: Normocephalic.   Neck: Normal carotid pulses and no JVD present. No tracheal tenderness present. Carotid bruit is not present. No tracheal deviation and no edema present.   Cardiovascular: Regular rhythm and normal pulses.   Murmur heard.   Systolic murmur is present with a grade of 2/6.  Pulmonary/Chest: Effort normal. No stridor.   Abdominal: Soft. She exhibits no distension. There is no hepatosplenomegaly. There is no tenderness.   Neurological: She is alert. She has normal strength. No cranial nerve deficit or sensory deficit. "   Skin: Skin is warm.   Psychiatric: She has a normal mood and affect. Her speech is normal and behavior is normal.       Results Review:    2014 Dr Broadbent    HEMODYNAMICS:  1.  LV pressure 130/16.   2.  No gradient across the aortic valve.   3.  Left ventriculography ejection fraction 60%.  No identifiable wall motion  abnormality.      IMPRESSION:  1.  Widely patent.  (Previously placed stents in both the proximal and  midportion of the left anterior descending).  2.  Mild to moderate diffuse coronary plaque, at this point left to be treated  medically.   3.  Preserved left ventricular systolic function.   4.  No significant aortic valve stenosis or mitral valve regurgitation.       myocardial perfusion scan   3/4/19        · Myocardial perfusion imaging indicates a medium-sized infarct located in the anterior wall and lateral wall with no significant ischemia noted.  · Left ventricular ejection fraction is hyperdynamic (Calculated EF > 70%).  · Breast attenuation artifact is present.            ECG 12 Lead  Date/Time: 8/27/2019 11:40 AM  Performed by: Angel Fajardo MD  Authorized by: Angel Fajardo MD   Comparison: compared with previous ECG from 5/29/2019  Similar to previous ECG  Rhythm: sinus bradycardia  Rate: bradycardic  Conduction: conduction normal  ST Segments: ST segments normal  T Waves: T waves normal  QRS axis: normal  Other findings: non-specific ST-T wave changes            Assessment/Plan   Nita was seen today for shortness of breath.    Diagnoses and all orders for this visit:    Dysphonia, spasmodic    Dyspnea, unspecified type  -     Adult Transthoracic Echo Complete W/ Cont if Necessary Per Protocol; Future    Mixed hyperlipidemia    Compression fracture of vertebral column (CMS/HCC)    Diastolic dysfunction    CAD in native artery    Mitral valve insufficiency, unspecified etiology  -     Adult Transthoracic Echo Complete W/ Cont if Necessary Per Protocol; Future    Other orders  -     ECG  12 Lead           Plan     Recommend continous and just not nightly oxygen therapy as as resting oxygen saturation is 96% . Last visit desaturation with light exertion to 72%. 99% with 2 LPM supplemental oxygen in recovery on exertion.        S/L NTG PRN for chest pain, call me or go to ER if has to use S/L nitroglycerin        Orders Placed This Encounter   Procedures   • ECG 12 Lead     This order was created via procedure documentation   • Adult Transthoracic Echo Complete W/ Cont if Necessary Per Protocol     Standing Status:   Future     Standing Expiration Date:   8/26/2020     Order Specific Question:   Reason for exam?     Answer:   Dyspnea     Order Specific Question:   Reason for exam?     Answer:   Valvular Function          ____________________________________________________________________________________________________________________________________________  Health maintenance and recommendations      Similar recommendations as last visit      Offered to give patient  a copy of my notes       Questions were encouraged, asked and answered to the patient's  understanding and satisfaction. Questions if any regarding current medications and side effects, need for refills and importance of compliance to medications stressed.    Reviewed available prior notes, consults, prior visits, laboratory findings, radiology and cardiology relevant reports. Updated chart as applicable. I have reviewed the patient's medical history in detail and updated the computerized patient record as relevant.      Updated patient regarding any new or relevant abnormalities on review of records or any new findings on physical exam. Mentioned to patient about purpose of visit and desirable health short and long term goals and objectives.    Primary to monitor CBC CMP Lipid panel and TSH as  applicable    ___________________________________________________________________________________________________________________________________________          Return in about 6 months (around 2/27/2020).

## 2021-03-13 ENCOUNTER — OFFICE VISIT (OUTPATIENT)
Dept: CARDIOLOGY | Facility: CLINIC | Age: 86
End: 2021-03-13

## 2021-03-13 VITALS
SYSTOLIC BLOOD PRESSURE: 106 MMHG | DIASTOLIC BLOOD PRESSURE: 65 MMHG | BODY MASS INDEX: 21.71 KG/M2 | WEIGHT: 118 LBS | HEIGHT: 62 IN | HEART RATE: 65 BPM

## 2021-03-13 DIAGNOSIS — R06.00 DYSPNEA, UNSPECIFIED TYPE: ICD-10-CM

## 2021-03-13 DIAGNOSIS — R00.1 BRADYCARDIA: ICD-10-CM

## 2021-03-13 DIAGNOSIS — E78.2 MIXED HYPERLIPIDEMIA: ICD-10-CM

## 2021-03-13 DIAGNOSIS — K21.9 GASTROESOPHAGEAL REFLUX DISEASE WITHOUT ESOPHAGITIS: ICD-10-CM

## 2021-03-13 DIAGNOSIS — I25.10 CAD IN NATIVE ARTERY: ICD-10-CM

## 2021-03-13 DIAGNOSIS — I10 ESSENTIAL HYPERTENSION: Primary | ICD-10-CM

## 2021-03-13 DIAGNOSIS — I51.89 DIASTOLIC DYSFUNCTION: ICD-10-CM

## 2021-03-13 DIAGNOSIS — I50.32 CHRONIC DIASTOLIC CHF (CONGESTIVE HEART FAILURE) (HCC): ICD-10-CM

## 2021-03-13 PROCEDURE — 99442 PR PHYS/QHP TELEPHONE EVALUATION 11-20 MIN: CPT | Performed by: INTERNAL MEDICINE

## 2021-03-13 NOTE — PROGRESS NOTES
Nita Mari  [id number]  10/20/1928  89 y.o.  female      This is a telephonic visit  You have chosen to receive care through a telephone visit. Do you consent to use a telephone visit for your medical care today? Yes      Referring Provider: Shahid Myers, DO    Reason for  Visit:     Routine virtual visit using above modality   coronary artery disease   stented coronary artery   Mild to moderate mitral regurgitation on prior echo 1/19 [facility]   Christina clip in past   Cardiac workup test results as below   In the interim patient has fallen twice  Has had dizziness  Has had fracture of her long bones  Has been in nursing home and in rehab      Subjective    As above  Continues to have moderate exertional shortness of breath relieved with rest.  Has had cough in the past which is now improved  Her blood pressures intermittently has been running low and she has been taken off her blood pressure medications  Has had dizzy spells  No episodes of syncope    No palpitations  No associated chest pain  Intermittent mild pedal edema that responds to diuretics    No fever or chills  No significant expectoration    No hemoptysis  No presyncope or syncope      Joint pain in small, medium and large joints   Chronic low back pain      Walks with a walker  Has had both COVID-19 vaccines    History of present illness:  Nita Mari is a 89 y.o. yo female with history of congestive heart failure mitral regurgitation s/p christina clip who presents today for   Chief Complaint   Patient presents with   • Follow-up   .    History  Past Medical History:   Diagnosis Date   • Arthritis    • Chronic diastolic CHF (congestive heart failure) (CMS/HCC) 1/30/2017   • Chronic osteoarthritis 1/30/2017   • Coronary arteriosclerosis 1/30/2017   • Diastolic dysfunction 1/30/2017   • Diffuse interstitial pulmonary fibrosis (CMS/HCC) 1/30/2017   • Dyspnea 1/30/2017   • Fibrosis of lung (CMS/HCC) 1/30/2017   •  GERD (gastroesophageal reflux disease) 1/30/2017   • Heart disease    • History of transfusion    • Hyperlipidemia 1/30/2017   • Hypertension    • Low back pain 1/30/2017   • Malignant neoplasm of skin 1/30/2017   • Mitral valve regurgitation    • Murmur 1/30/2017   • Prerenal renal failure 1/30/2017   • Spinal stenosis of lumbar region 1/30/2017   ,   Past Surgical History:   Procedure Laterality Date   • APPENDECTOMY     • CARDIAC CATHETERIZATION     • CHOLECYSTECTOMY     • COLON SURGERY  05/06/2015    POLYP REMOVAL   • CORONARY ANGIOPLASTY WITH STENT PLACEMENT      STENTS X3   • HYSTERECTOMY     • MITRAL VALVE REPAIR/REPLACEMENT      repair with MITRACLIP    ,   Family History   Problem Relation Age of Onset   • Heart attack Father    • COPD Other    • Diabetes Other    • Heart disease Other    ,   Social History     Tobacco Use   • Smoking status: Never Smoker   • Smokeless tobacco: Never Used   Substance Use Topics   • Alcohol use: No   • Drug use: No   ,     Medications  Current Outpatient Medications   Medication Sig Dispense Refill   • acetaminophen (TYLENOL) 325 MG tablet Take 325 mg by mouth Every 6 (Six) Hours As Needed for Mild Pain .     • benzonatate (Tessalon Perles) 100 MG capsule Take 1 capsule by mouth 3 (Three) Times a Day As Needed for Cough. 90 capsule 3   • bethanechol (URECHOLINE) 25 MG tablet Take 25 mg by mouth 2 (Two) Times a Day.     • Calcium Carbonate-Vitamin D (CALCIUM 500 + D PO) Take 1 tablet by mouth Daily.     • carvedilol (COREG) 6.25 MG tablet Take 6.25 mg by mouth 2 (Two) Times a Day With Meals.     • clopidogrel (PLAVIX) 75 MG tablet Take 75 mg by mouth Daily.     • furosemide (LASIX) 40 MG tablet Take 20 mg by mouth 2 (Two) Times a Day.     • gabapentin (NEURONTIN) 400 MG capsule Take 400 mg by mouth Every Night.     • isosorbide mononitrate (IMDUR) 30 MG 24 hr tablet Take 1 tablet by mouth Daily. 90 tablet 3   • isosorbide mononitrate (IMDUR) 30 MG 24 hr tablet TAKE 1 TABLET  "BY MOUTH DAILY. 90 tablet 3   • nitroglycerin (NITROSTAT) 0.4 MG SL tablet 1 under the tongue as needed for angina, may repeat q5mins for up three doses 100 tablet 11   • pantoprazole (PROTONIX) 40 MG EC tablet Take 40 mg by mouth 2 (Two) Times a Day.     • potassium chloride (K-DUR,KLOR-CON) 20 MEQ CR tablet Take 20 mEq by mouth Daily.     • pravastatin (PRAVACHOL) 20 MG tablet Take 20 mg by mouth Daily.     • raNITIdine (ZANTAC) 300 MG tablet Take 300 mg by mouth 2 (Two) Times a Day.     • sodium bicarbonate 650 MG tablet Take 650 mg by mouth 2 (Two) Times a Day.     • sucralfate (CARAFATE) 1 G tablet Take 1 g by mouth 4 (Four) Times a Day.     • traZODone (DESYREL) 100 MG tablet Take 100 mg by mouth Every Night.       No current facility-administered medications for this visit.       Allergies:  Aspirin, Codeine, Contrast dye, Iodine, Lisinopril, and Penicillins    Review of Systems  Review of Systems   Constitutional: Positive for malaise/fatigue.   HENT: Negative.    Eyes: Negative.    Cardiovascular: Positive for dyspnea on exertion. Negative for chest pain, claudication, cyanosis, irregular heartbeat, leg swelling, near-syncope, orthopnea, palpitations, paroxysmal nocturnal dyspnea and syncope.   Respiratory: Positive for cough and shortness of breath.    Endocrine: Negative.    Hematologic/Lymphatic: Negative.    Skin: Negative.    Musculoskeletal: Positive for arthritis and back pain.   Gastrointestinal: Negative for anorexia.   Genitourinary: Negative.    Neurological: Positive for weakness.   Psychiatric/Behavioral: Negative.        Objective     Physical Exam:  /65   Pulse 65   Ht 157.5 cm (62\")   Wt 53.5 kg (118 lb)   BMI 21.58 kg/m²     Self reported vitals above as available      • General demeanor : Patient is calm and composed.  There is no distress.  Patient can converse normally    • Description of patient's judgment and insight: Normal  • Brief assessment of mental status, which may " include:  - Orientation to time, place, and person  - Recent and remote memory intact  - Mood and affect is normal  • Per patient no problems with vision or eyes that is new  • Per self examination of patient no issues with her ears or nose or gums or teeth  • Assessment of hearing: Normal  • self examination of patient no reported mass in her neck    • Patient can carry out her normal speech and does not have any respiratory difficulties  • Per self examination of patient no significant lower extremity edema  • Per self examination of patient no reported skin issues that is new  • Per self-examination gait is normal and unchanged from baseline   • Per self examination of patient digits and nails are normal without any cyanosis or swelling  • Per self examination of patient no issues with joints or bones that is new  • Assessment of range of motion with notation of any pain, crepitation or contracture      Results Review:      Results for orders placed during the hospital encounter of 02/05/20    Adult Transthoracic Echo Complete W/ Cont if Necessary Per Protocol    Interpretation Summary  · Estimated EF = 65%.  · Left ventricular systolic function is normal.  · Left ventricular diastolic dysfunction.  · Mild to moderate tricuspid valve regurgitation is present.  · Mild mitral valve regurgitation is present  · Moderate pulmonary hypertension is present.       2014 Dr Broadbent    HEMODYNAMICS:  1.  LV pressure 130/16.   2.  No gradient across the aortic valve.   3.  Left ventriculography ejection fraction 60%.  No identifiable wall motion  abnormality.      IMPRESSION:  1.  Widely patent.  (Previously placed stents in both the proximal and  midportion of the left anterior descending).  2.  Mild to moderate diffuse coronary plaque, at this point left to be treated  medically.   3.  Preserved left ventricular systolic function.   4.  No significant aortic valve stenosis or mitral valve regurgitation.       myocardial  perfusion scan   3/4/19        · Myocardial perfusion imaging indicates a medium-sized infarct located in the anterior wall and lateral wall with no significant ischemia noted.  · Left ventricular ejection fraction is hyperdynamic (Calculated EF > 70%).  · Breast attenuation artifact is present.          Procedures    Assessment/Plan   Diagnoses and all orders for this visit:    1. Essential hypertension (Primary)    2. Mixed hyperlipidemia    3. Chronic diastolic CHF (congestive heart failure) (CMS/HCC)    4. CAD in native artery    5. Diastolic dysfunction    6. Gastroesophageal reflux disease without esophagitis    7. Dyspnea, unspecified type    8. Bradycardia  -     Holter Monitor - 72 Hour Up To 15 Days; Future      _______________________________________________________________________________________________________________________________________  Health maintenance and recommendations      Similar recommendations as last visit  Offered to give patient  a copy of my notes     Questions were encouraged, asked and answered to the patient's  understanding and satisfaction. Questions if any regarding current medications and side effects, need for refills and importance of compliance to medications stressed.    Reviewed available prior notes, consults, prior visits, laboratory findings, radiology and cardiology relevant reports. Updated chart as applicable. I have reviewed the patient's medical history in detail and updated the computerized patient record as relevant.      Updated patient regarding any new or relevant abnormalities on review of records or any new findings on physical exam. Mentioned to patient about purpose of visit and desirable health short and long term goals and objectives.    Primary to monitor CBC CMP Lipid panel and TSH as applicable    ___________________________________________________________________________________________________________________________________________       Plan        Due to recurrent falls with intermittent dizziness with recurrent fracture of her right leg will do 2-week outpatient cardiac telemetry as before    Orders Placed This Encounter   Procedures   • Holter Monitor - 72 Hour Up To 15 Days     Mail to her     Standing Status:   Future     Standing Expiration Date:   3/13/2022     Order Specific Question:   Reason for exam?     Answer:   Dizziness          Patient expressed understanding  Encouraged and answered all questions   Discussed with the patient and all questioned fully answered. She will call me if any problems arise.   Discussed results of prior testing with patient: Prior echo  In future will require another echocardiogram  Currently the situation is such she cannot travel to get this    I support the patient's decision to take the Covid -19 vaccine   Total time spent on telephone with discussions planning 15  minutes and 30 seconds            Return in about 2 months (around 5/13/2021) for telephone visit.

## 2021-05-15 ENCOUNTER — OFFICE VISIT (OUTPATIENT)
Dept: CARDIOLOGY | Facility: CLINIC | Age: 86
End: 2021-05-15

## 2021-05-15 VITALS
BODY MASS INDEX: 21.9 KG/M2 | DIASTOLIC BLOOD PRESSURE: 58 MMHG | SYSTOLIC BLOOD PRESSURE: 109 MMHG | HEIGHT: 62 IN | WEIGHT: 119 LBS | HEART RATE: 58 BPM

## 2021-05-15 DIAGNOSIS — R06.09 DOE (DYSPNEA ON EXERTION): ICD-10-CM

## 2021-05-15 DIAGNOSIS — R07.9 CHEST PAIN IN ADULT: Primary | ICD-10-CM

## 2021-05-15 PROCEDURE — 99443 PR PHYS/QHP TELEPHONE EVALUATION 21-30 MIN: CPT | Performed by: INTERNAL MEDICINE

## 2021-05-15 NOTE — PROGRESS NOTES
Nita Mari  [id number]  10/20/1928  89 y.o.  female      This is a telephonic visit  You have chosen to receive care through a telephone visit. Do you consent to use a telephone visit for your medical care today? Yes      Referring Provider: Shahid Myers, DO    Reason for  Visit:     Routine virtual visit using above modality   coronary artery disease   stented coronary artery   Mild to moderate mitral regurgitation on prior echo 1/19 [facility]   Christina clip in past   Cardiac workup test results as below   In the interim patient has fallen twice  Has had dizziness  Has had fracture of her long bones  Has been in nursing home and in rehab      Subjective    Complains of increasing shortness of breath  Intermittently has had substernal chest pain  This can occur with exertion or rest  Sometimes she wakes up with this chest pain  Feels increasingly weak and tired  Occasionally feels fluttering sensation in her chest  No presyncope  No syncope  No orthopnea  In October last year broke her right femur twice a month apart due to falls  There was no loss of consciousness  These were accidental falls  Since then has not fallen again  Was in rehab and nursing home from October to February  Had Covid at that time  She has had both Covid vaccines  Her main complaint is transient substernal chest pain which lasts for less than 5 minutes  There is no radiation  Chest pain is nonpositional  Chest pain is nonpleuritic  Chest pain is none gustatory  In addition she has significant shortness of breath  Uses oxygen mostly at night  No bleeding issues  No excessive bruising  Tolerating current medications well  Blood pressure is under control    History of present illness:  Nita Mari is a 89 y.o. yo female with history of congestive heart failure mitral regurgitation s/p christina clip who presents today for   Chief Complaint   Patient presents with   • Follow-up   .    History  Past Medical  History:   Diagnosis Date   • Arthritis    • Chronic diastolic CHF (congestive heart failure) (CMS/[facility]) 1/30/2017   • Chronic osteoarthritis 1/30/2017   • Coronary arteriosclerosis 1/30/2017   • Diastolic dysfunction 1/30/2017   • Diffuse interstitial pulmonary fibrosis (CMS/[facility]) 1/30/2017   • Dyspnea 1/30/2017   • Fibrosis of lung (CMS/[facility]) 1/30/2017   • GERD (gastroesophageal reflux disease) 1/30/2017   • Heart disease    • History of transfusion    • Hyperlipidemia 1/30/2017   • Hypertension    • Low back pain 1/30/2017   • Malignant neoplasm of skin 1/30/2017   • Mitral valve regurgitation    • Murmur 1/30/2017   • Prerenal renal failure 1/30/2017   • Spinal stenosis of lumbar region 1/30/2017   ,   Past Surgical History:   Procedure Laterality Date   • APPENDECTOMY     • CARDIAC CATHETERIZATION     • CHOLECYSTECTOMY     • COLON SURGERY  05/06/2015    POLYP REMOVAL   • CORONARY ANGIOPLASTY WITH STENT PLACEMENT      STENTS X3   • HYSTERECTOMY     • MITRAL VALVE REPAIR/REPLACEMENT      repair with MITRACLIP    ,   Family History   Problem Relation Age of Onset   • Heart attack Father    • COPD Other    • Diabetes Other    • Heart disease Other    ,   Social History     Tobacco Use   • Smoking status: Never Smoker   • Smokeless tobacco: Never Used   Substance Use Topics   • Alcohol use: No   • Drug use: No   ,     Medications  Current Outpatient Medications   Medication Sig Dispense Refill   • acetaminophen (TYLENOL) 325 MG tablet Take 325 mg by mouth Every 6 (Six) Hours As Needed for Mild Pain .     • benzonatate (Tessalon Perles) 100 MG capsule Take 1 capsule by mouth 3 (Three) Times a Day As Needed for Cough. 90 capsule 3   • bethanechol (URECHOLINE) 25 MG tablet Take 25 mg by mouth 2 (Two) Times a Day.     • Calcium Carbonate-Vitamin D (CALCIUM 500 + D PO) Take 1 tablet by mouth Daily.     • carvedilol (COREG) 6.25 MG tablet Take 6.25 mg by mouth 2 (Two) Times a Day With Meals.     • clopidogrel (PLAVIX) 75 MG  tablet Take 75 mg by mouth Daily.     • furosemide (LASIX) 40 MG tablet Take 20 mg by mouth 2 (Two) Times a Day.     • gabapentin (NEURONTIN) 400 MG capsule Take 400 mg by mouth Every Night.     • isosorbide mononitrate (IMDUR) 30 MG 24 hr tablet Take 1 tablet by mouth Daily. 90 tablet 3   • isosorbide mononitrate (IMDUR) 30 MG 24 hr tablet TAKE 1 TABLET BY MOUTH DAILY. 90 tablet 3   • nitroglycerin (NITROSTAT) 0.4 MG SL tablet 1 under the tongue as needed for angina, may repeat q5mins for up three doses 100 tablet 11   • pantoprazole (PROTONIX) 40 MG EC tablet Take 40 mg by mouth 2 (Two) Times a Day.     • potassium chloride (K-DUR,KLOR-CON) 20 MEQ CR tablet Take 20 mEq by mouth Daily.     • pravastatin (PRAVACHOL) 20 MG tablet Take 20 mg by mouth Daily.     • raNITIdine (ZANTAC) 300 MG tablet Take 300 mg by mouth 2 (Two) Times a Day.     • sodium bicarbonate 650 MG tablet Take 650 mg by mouth 2 (Two) Times a Day.     • sucralfate (CARAFATE) 1 G tablet Take 1 g by mouth 4 (Four) Times a Day.     • traZODone (DESYREL) 100 MG tablet Take 100 mg by mouth Every Night.       No current facility-administered medications for this visit.       Allergies:  Aspirin, Codeine, Contrast dye, Iodine, Lisinopril, and Penicillins    Review of Systems  Review of Systems   Constitutional: Positive for malaise/fatigue.   HENT: Negative.    Eyes: Negative.    Cardiovascular: Positive for dyspnea on exertion. Negative for chest pain, claudication, cyanosis, irregular heartbeat, leg swelling, near-syncope, orthopnea, palpitations, paroxysmal nocturnal dyspnea and syncope.   Respiratory: Positive for cough and shortness of breath.    Endocrine: Negative.    Hematologic/Lymphatic: Negative.    Skin: Negative.    Musculoskeletal: Positive for arthritis and back pain.   Gastrointestinal: Negative for anorexia.   Genitourinary: Negative.    Neurological: Positive for weakness.   Psychiatric/Behavioral: Negative.        Objective     Physical  "Exam:  /58   Pulse 58   Ht 157.5 cm (62\")   Wt 54 kg (119 lb)   BMI 21.77 kg/m²     Self reported vitals above as available      • General demeanor : Patient is calm and composed.  There is no distress.  Patient can converse normally    • Description of patient's judgment and insight: Normal  • Brief assessment of mental status, which may include:  - Orientation to time, place, and person  - Recent and remote memory intact  - Mood and affect is normal  • Per patient no problems with vision or eyes that is new  • Per self examination of patient no issues with her ears or nose or gums or teeth  • Assessment of hearing: Normal  • self examination of patient no reported mass in her neck    • Patient can carry out her normal speech and does not have any respiratory difficulties  • Per self examination of patient no significant lower extremity edema  • Per self examination of patient no reported skin issues that is new  • Per self-examination gait is normal and unchanged from baseline   • Per self examination of patient digits and nails are normal without any cyanosis or swelling  • Per self examination of patient no issues with joints or bones that is new  • Assessment of range of motion with notation of any pain, crepitation or contracture      Results Review:    Nita Mari  HOLTER MONITOR >7 DAYS UP TO 15 DAYS INTERP ONLY (CLINIC)  Order# [id number]  Reading physician: Angel Fajardo MD Ordering physician: Angel Fajardo MD Study date: 3/19/21   Patient Information    Patient Name   Nita Mari MRN   [id number] Legal Sex   Female  (Age)   10/20/1928 (89 y.o.)   Interpretation Summary    ·  Entire report was reviewed.  Monitoring in days: ~14   · The predominant rhythm noted during the testing period was sinus rhythm.     · Frequent supraventricular ectopics with an APC burden of: 8.2 %    · Rare premature ventricular contractions with a PVC burden of: < 0.1%     · No correlated arrhythmia  · No " significant pauses                  Conclusion:      Baseline rhythm is sinus.  Frequent premature atrial contractions with a PAC burden of 8.2%  847 runs of supraventricular tachycardia longest 20 beats at a maximum rate of 180 bpm and an average of 127 bpm           Results for orders placed during the hospital encounter of 02/05/20    Adult Transthoracic Echo Complete W/ Cont if Necessary Per Protocol    Interpretation Summary  · Estimated EF = 65%.  · Left ventricular systolic function is normal.  · Left ventricular diastolic dysfunction.  · Mild to moderate tricuspid valve regurgitation is present.  · Mild mitral valve regurgitation is present  · Moderate pulmonary hypertension is present.       2014 Dr Broadbent    HEMODYNAMICS:  1.  LV pressure 130/16.   2.  No gradient across the aortic valve.   3.  Left ventriculography ejection fraction 60%.  No identifiable wall motion  abnormality.      IMPRESSION:  1.  Widely patent.  (Previously placed stents in both the proximal and  midportion of the left anterior descending).  2.  Mild to moderate diffuse coronary plaque, at this point left to be treated  medically.   3.  Preserved left ventricular systolic function.   4.  No significant aortic valve stenosis or mitral valve regurgitation.       myocardial perfusion scan   3/4/19        · Myocardial perfusion imaging indicates a medium-sized infarct located in the anterior wall and lateral wall with no significant ischemia noted.  · Left ventricular ejection fraction is hyperdynamic (Calculated EF > 70%).  · Breast attenuation artifact is present.          Procedures    Assessment/Plan   Diagnoses and all orders for this visit:    1. Chest pain in adult (Primary)  -     Stress Test With Myocardial Perfusion One Day; Future    2. LYONS (dyspnea on exertion)  -     Adult Transthoracic Echo Complete w/ Color, Spectral and Contrast if necessary per protocol; Future  -     XR Chest 2 View;  Future      _______________________________________________________________________________________________________________________________________  Health maintenance and recommendations      Similar recommendations as last visit  Offered to give patient  a copy of my notes     Questions were encouraged, asked and answered to the patient's  understanding and satisfaction. Questions if any regarding current medications and side effects, need for refills and importance of compliance to medications stressed.    Reviewed available prior notes, consults, prior visits, laboratory findings, radiology and cardiology relevant reports. Updated chart as applicable. I have reviewed the patient's medical history in detail and updated the computerized patient record as relevant.      Updated patient regarding any new or relevant abnormalities on review of records or any new findings on physical exam. Mentioned to patient about purpose of visit and desirable health short and long term goals and objectives.    Primary to monitor CBC CMP Lipid panel and TSH as applicable    ___________________________________________________________________________________________________________________________________________       Plan         Orders Placed This Encounter   Procedures   • XR Chest 2 View     Standing Status:   Future     Standing Expiration Date:   5/15/2022     Order Specific Question:   Reason for Exam:     Answer:   shortness of breath     Order Specific Question:   Does this patient have a diabetic monitoring/medication delivering device on?     Answer:   No     Order Specific Question:   Release to patient     Answer:   Immediate   • Stress Test With Myocardial Perfusion One Day     Standing Status:   Future     Standing Expiration Date:   5/15/2022     Order Specific Question:   What stress agent will be used?     Answer:   Regadenoson (Lexiscan)     Order Specific Question:   Difficulty walking criteria?     Answer:    Musculoskeletal (hips, knees, feet, back, amputee)     Order Specific Question:   Reason for exam?     Answer:   Chest Pain   • Adult Transthoracic Echo Complete w/ Color, Spectral and Contrast if necessary per protocol     Myocardial strain to be performed during echocardiogram as long as technically feasible     Standing Status:   Future     Standing Expiration Date:   5/15/2022     Order Specific Question:   Reason for exam?     Answer:   Dyspnea     Order Specific Question:   Reason for exam?     Answer:   Arrhythmia     Order Specific Question:   Release to patient     Answer:   Immediate      Discussed results of cardiac monitor with her  She would like to continue on the same dose of Coreg  Overall despite being frequent her SVT episodes are minimally symptomatic  We will review above test results with her in approximately 6 weeks  We will try to arrange all her testing on Monday is very difficult for her to travel given her generalized frailty as well as asking for help to be brought to the hospital for testing  We will see her back in 6 weeks however if any interim issues will see her right away either virtually or in person      Time spent in this visit was 26 minutes    Return in about 6 weeks (around 6/26/2021).

## 2021-06-24 RX ORDER — IBANDRONATE SODIUM 150 MG/1
150 TABLET, FILM COATED ORAL
COMMUNITY
Start: 2021-05-04

## 2021-06-30 ENCOUNTER — HOSPITAL ENCOUNTER (OUTPATIENT)
Dept: CARDIOLOGY | Facility: HOSPITAL | Age: 86
Discharge: HOME | End: 2021-06-30

## 2021-06-30 ENCOUNTER — HOSPITAL ENCOUNTER (OUTPATIENT)
Dept: CARDIOLOGY | Facility: HOSPITAL | Age: 86
Discharge: HOME | End: 2021-06-30
Admitting: INTERNAL MEDICINE

## 2021-06-30 VITALS
HEIGHT: 62 IN | DIASTOLIC BLOOD PRESSURE: 67 MMHG | HEART RATE: 56 BPM | WEIGHT: 120 LBS | BODY MASS INDEX: 22.08 KG/M2 | SYSTOLIC BLOOD PRESSURE: 121 MMHG

## 2021-06-30 DIAGNOSIS — R07.9 CHEST PAIN IN ADULT: ICD-10-CM

## 2021-06-30 DIAGNOSIS — R06.09 DOE (DYSPNEA ON EXERTION): ICD-10-CM

## 2021-06-30 LAB
BH CV REST NUCLEAR ISOTOPE DOSE: 10.6 MCI
BH CV STRESS BP STAGE 1: NORMAL
BH CV STRESS COMMENTS STAGE 1: NORMAL
BH CV STRESS DOSE REGADENOSON STAGE 1: 0.4
BH CV STRESS DURATION MIN STAGE 1: 0
BH CV STRESS DURATION SEC STAGE 1: 10
BH CV STRESS HR STAGE 1: 78
BH CV STRESS NUCLEAR ISOTOPE DOSE: 32 MCI
BH CV STRESS PROTOCOL 1: NORMAL
BH CV STRESS RECOVERY BP: NORMAL MMHG
BH CV STRESS RECOVERY HR: 70 BPM
BH CV STRESS STAGE 1: 1
LV EF NUC BP: 65 %
MAXIMAL PREDICTED HEART RATE: 128 BPM
PERCENT MAX PREDICTED HR: 60.94 %
STRESS BASELINE BP: NORMAL MMHG
STRESS BASELINE HR: 56 BPM
STRESS PERCENT HR: 72 %
STRESS POST EXERCISE DUR SEC: 10 SEC
STRESS POST PEAK BP: NORMAL MMHG
STRESS POST PEAK HR: 78 BPM
STRESS TARGET HR: 109 BPM

## 2021-06-30 PROCEDURE — 93306 TTE W/DOPPLER COMPLETE: CPT

## 2021-06-30 PROCEDURE — 93017 CV STRESS TEST TRACING ONLY: CPT

## 2021-06-30 PROCEDURE — 78452 HT MUSCLE IMAGE SPECT MULT: CPT | Performed by: INTERNAL MEDICINE

## 2021-06-30 PROCEDURE — 93356 MYOCRD STRAIN IMG SPCKL TRCK: CPT

## 2021-06-30 PROCEDURE — 78452 HT MUSCLE IMAGE SPECT MULT: CPT

## 2021-06-30 PROCEDURE — A9500 TC99M SESTAMIBI: HCPCS | Performed by: INTERNAL MEDICINE

## 2021-06-30 PROCEDURE — 0 TECHNETIUM SESTAMIBI: Performed by: INTERNAL MEDICINE

## 2021-06-30 PROCEDURE — 93018 CV STRESS TEST I&R ONLY: CPT | Performed by: INTERNAL MEDICINE

## 2021-06-30 PROCEDURE — 93356 MYOCRD STRAIN IMG SPCKL TRCK: CPT | Performed by: INTERNAL MEDICINE

## 2021-06-30 PROCEDURE — 93306 TTE W/DOPPLER COMPLETE: CPT | Performed by: INTERNAL MEDICINE

## 2021-06-30 PROCEDURE — 25010000002 REGADENOSON 0.4 MG/5ML SOLUTION: Performed by: INTERNAL MEDICINE

## 2021-06-30 RX ADMIN — REGADENOSON 0.4 MG: 0.08 INJECTION, SOLUTION INTRAVENOUS at 09:42

## 2021-06-30 RX ADMIN — TECHNETIUM TC 99M SESTAMIBI 1 DOSE: 1 INJECTION INTRAVENOUS at 08:38

## 2021-06-30 RX ADMIN — TECHNETIUM TC 99M SESTAMIBI 1 DOSE: 1 INJECTION INTRAVENOUS at 09:55

## 2021-07-02 ENCOUNTER — OFFICE VISIT (OUTPATIENT)
Dept: CARDIOLOGY | Facility: CLINIC | Age: 86
End: 2021-07-02

## 2021-07-02 VITALS
OXYGEN SATURATION: 97 % | WEIGHT: 122 LBS | HEART RATE: 67 BPM | SYSTOLIC BLOOD PRESSURE: 122 MMHG | DIASTOLIC BLOOD PRESSURE: 70 MMHG | HEIGHT: 62 IN | BODY MASS INDEX: 22.45 KG/M2

## 2021-07-02 DIAGNOSIS — E78.2 MIXED HYPERLIPIDEMIA: ICD-10-CM

## 2021-07-02 DIAGNOSIS — J84.10 DIFFUSE INTERSTITIAL PULMONARY FIBROSIS (HCC): Primary | ICD-10-CM

## 2021-07-02 DIAGNOSIS — R06.00 DYSPNEA, UNSPECIFIED TYPE: ICD-10-CM

## 2021-07-02 DIAGNOSIS — I10 ESSENTIAL HYPERTENSION: ICD-10-CM

## 2021-07-02 DIAGNOSIS — I50.32 CHRONIC DIASTOLIC CHF (CONGESTIVE HEART FAILURE) (HCC): ICD-10-CM

## 2021-07-02 DIAGNOSIS — I51.89 DIASTOLIC DYSFUNCTION: ICD-10-CM

## 2021-07-02 LAB
BH CV ECHO MEAS - AO MAX PG (FULL): 9.8 MMHG
BH CV ECHO MEAS - AO MAX PG: 15.5 MMHG
BH CV ECHO MEAS - AO MEAN PG (FULL): 6 MMHG
BH CV ECHO MEAS - AO MEAN PG: 8 MMHG
BH CV ECHO MEAS - AO ROOT AREA (BSA CORRECTED): 1.9
BH CV ECHO MEAS - AO ROOT AREA: 6.6 CM^2
BH CV ECHO MEAS - AO ROOT DIAM: 2.9 CM
BH CV ECHO MEAS - AO V2 MAX: 197 CM/SEC
BH CV ECHO MEAS - AO V2 MEAN: 137 CM/SEC
BH CV ECHO MEAS - AO V2 VTI: 49.1 CM
BH CV ECHO MEAS - AVA(I,A): 1.6 CM^2
BH CV ECHO MEAS - AVA(I,D): 1.6 CM^2
BH CV ECHO MEAS - AVA(V,A): 1.9 CM^2
BH CV ECHO MEAS - AVA(V,D): 1.9 CM^2
BH CV ECHO MEAS - BSA(HAYCOCK): 1.5 M^2
BH CV ECHO MEAS - BSA: 1.5 M^2
BH CV ECHO MEAS - BZI_BMI: 21.8 KILOGRAMS/M^2
BH CV ECHO MEAS - BZI_METRIC_HEIGHT: 157.5 CM
BH CV ECHO MEAS - BZI_METRIC_WEIGHT: 54 KG
BH CV ECHO MEAS - EDV(CUBED): 35.6 ML
BH CV ECHO MEAS - EDV(MOD-SP2): 41.6 ML
BH CV ECHO MEAS - EDV(MOD-SP4): 58.1 ML
BH CV ECHO MEAS - EDV(TEICH): 43.8 ML
BH CV ECHO MEAS - EF(CUBED): 65.8 %
BH CV ECHO MEAS - EF(MOD-SP2): 64.7 %
BH CV ECHO MEAS - EF(MOD-SP4): 61.3 %
BH CV ECHO MEAS - EF(TEICH): 58.6 %
BH CV ECHO MEAS - ESV(CUBED): 12.2 ML
BH CV ECHO MEAS - ESV(MOD-SP2): 14.7 ML
BH CV ECHO MEAS - ESV(MOD-SP4): 22.5 ML
BH CV ECHO MEAS - ESV(TEICH): 18.1 ML
BH CV ECHO MEAS - FS: 30.1 %
BH CV ECHO MEAS - IVS/LVPW: 0.67
BH CV ECHO MEAS - IVSD: 0.66 CM
BH CV ECHO MEAS - LAT PEAK E' VEL: 5.1 CM/SEC
BH CV ECHO MEAS - LV DIASTOLIC VOL/BSA (35-75): 37.9 ML/M^2
BH CV ECHO MEAS - LV MASS(C)D: 71.9 GRAMS
BH CV ECHO MEAS - LV MASS(C)DI: 46.9 GRAMS/M^2
BH CV ECHO MEAS - LV MAX PG: 5.8 MMHG
BH CV ECHO MEAS - LV MEAN PG: 2 MMHG
BH CV ECHO MEAS - LV SYSTOLIC VOL/BSA (12-30): 14.7 ML/M^2
BH CV ECHO MEAS - LV V1 MAX: 120 CM/SEC
BH CV ECHO MEAS - LV V1 MEAN: 69.6 CM/SEC
BH CV ECHO MEAS - LV V1 VTI: 24.9 CM
BH CV ECHO MEAS - LVIDD: 3.3 CM
BH CV ECHO MEAS - LVIDS: 2.3 CM
BH CV ECHO MEAS - LVLD AP2: 5.8 CM
BH CV ECHO MEAS - LVLD AP4: 6.2 CM
BH CV ECHO MEAS - LVLS AP2: 5.1 CM
BH CV ECHO MEAS - LVLS AP4: 5.4 CM
BH CV ECHO MEAS - LVOT AREA (M): 3.1 CM^2
BH CV ECHO MEAS - LVOT AREA: 3.1 CM^2
BH CV ECHO MEAS - LVOT DIAM: 2 CM
BH CV ECHO MEAS - LVPWD: 1 CM
BH CV ECHO MEAS - MED PEAK E' VEL: 4.9 CM/SEC
BH CV ECHO MEAS - MR MAX PG: 114.1 MMHG
BH CV ECHO MEAS - MR MAX VEL: 534 CM/SEC
BH CV ECHO MEAS - MR MEAN PG: 81 MMHG
BH CV ECHO MEAS - MR MEAN VEL: 429.5 CM/SEC
BH CV ECHO MEAS - MR VTI: 188 CM
BH CV ECHO MEAS - MV A MAX VEL: 110 CM/SEC
BH CV ECHO MEAS - MV DEC SLOPE: 533 CM/SEC^2
BH CV ECHO MEAS - MV DEC TIME: 327 SEC
BH CV ECHO MEAS - MV E MAX VEL: 172 CM/SEC
BH CV ECHO MEAS - MV E/A: 1.6
BH CV ECHO MEAS - MV MAX PG: 17.3 MMHG
BH CV ECHO MEAS - MV MEAN PG: 4 MMHG
BH CV ECHO MEAS - MV P1/2T MAX VEL: 205 CM/SEC
BH CV ECHO MEAS - MV P1/2T: 112.7 MSEC
BH CV ECHO MEAS - MV V2 MAX: 208 CM/SEC
BH CV ECHO MEAS - MV V2 MEAN: 85.9 CM/SEC
BH CV ECHO MEAS - MV V2 VTI: 63.4 CM
BH CV ECHO MEAS - MVA P1/2T LCG: 1.1 CM^2
BH CV ECHO MEAS - MVA(P1/2T): 2 CM^2
BH CV ECHO MEAS - MVA(VTI): 1.2 CM^2
BH CV ECHO MEAS - RAP SYSTOLE: 5 MMHG
BH CV ECHO MEAS - RVSP: 69 MMHG
BH CV ECHO MEAS - SI(AO): 211.6 ML/M^2
BH CV ECHO MEAS - SI(CUBED): 15.3 ML/M^2
BH CV ECHO MEAS - SI(LVOT): 51 ML/M^2
BH CV ECHO MEAS - SI(MOD-SP2): 17.5 ML/M^2
BH CV ECHO MEAS - SI(MOD-SP4): 23.2 ML/M^2
BH CV ECHO MEAS - SI(TEICH): 16.8 ML/M^2
BH CV ECHO MEAS - SV(AO): 324.3 ML
BH CV ECHO MEAS - SV(CUBED): 23.4 ML
BH CV ECHO MEAS - SV(LVOT): 78.2 ML
BH CV ECHO MEAS - SV(MOD-SP2): 26.9 ML
BH CV ECHO MEAS - SV(MOD-SP4): 35.6 ML
BH CV ECHO MEAS - SV(TEICH): 25.7 ML
BH CV ECHO MEAS - TAPSE (>1.6): 1.7 CM
BH CV ECHO MEAS - TR MAX VEL: 396 CM/SEC
BH CV ECHO MEASUREMENTS AVERAGE E/E' RATIO: 34.4
BH CV XLRA - RV BASE: 2.8 CM
BH CV XLRA - TDI S': 12.1 CM/SEC
LEFT ATRIUM VOLUME INDEX: 39 ML/M2
MAXIMAL PREDICTED HEART RATE: 128 BPM
STRESS TARGET HR: 109 BPM

## 2021-07-02 PROCEDURE — 99214 OFFICE O/P EST MOD 30 MIN: CPT | Performed by: INTERNAL MEDICINE

## 2021-07-02 PROCEDURE — 93000 ELECTROCARDIOGRAM COMPLETE: CPT | Performed by: INTERNAL MEDICINE

## 2021-07-02 RX ORDER — ISOSORBIDE MONONITRATE 30 MG/1
30 TABLET, EXTENDED RELEASE ORAL DAILY
COMMUNITY

## 2021-07-02 NOTE — PROGRESS NOTES
Nita Mari  [id number]  10/20/1928  89 y.o.  female      Referring Provider: Shahid Myers DO    Reason for  Visit:     Here for routine follow up   coronary artery disease   stented coronary artery   Mild to moderate mitral regurgitation on prior echo 1/19 [facility]   Christina clip in past   Cardiac workup test results as below   In the interim patient has fallen twice  Has had dizziness  Has had fracture of her long bones  Has been in nursing home and in rehab in the past.      Subjective    Had recent testing  Has returned for follow-up  Continues to have shortness of breath  Uses oxygen at night  Does not have a way to use oxygen during daytime hours  Has had some transient palpitations  No presyncope  No syncope  No orthopnea  No paroxysmal nocturnal dyspnea  No bleeding issues  Walks with a walker  No falls  History of palpitations      History of present illness:  Nita Mari is a 89 y.o. yo female with history of congestive heart failure mitral regurgitation s/p christina clip who presents today for   Chief Complaint   Patient presents with   • Coronary Artery Disease     1 mo f/u - results   .    History  Past Medical History:   Diagnosis Date   • Arthritis    • Chronic diastolic CHF (congestive heart failure) (CMS/HCC) 1/30/2017   • Chronic osteoarthritis 1/30/2017   • Coronary arteriosclerosis 1/30/2017   • Diastolic dysfunction 1/30/2017   • Diffuse interstitial pulmonary fibrosis (CMS/HCC) 1/30/2017   • Dyspnea 1/30/2017   • Fibrosis of lung (CMS/HCC) 1/30/2017   • GERD (gastroesophageal reflux disease) 1/30/2017   • Heart disease    • History of transfusion    • Hyperlipidemia 1/30/2017   • Hypertension    • Low back pain 1/30/2017   • Malignant neoplasm of skin 1/30/2017   • Mitral valve regurgitation    • Murmur 1/30/2017   • Prerenal renal failure 1/30/2017   • Spinal stenosis of lumbar region 1/30/2017   ,   Past Surgical History:   Procedure Laterality Date    • APPENDECTOMY     • CARDIAC CATHETERIZATION     • CHOLECYSTECTOMY     • COLON SURGERY  05/06/2015    POLYP REMOVAL   • CORONARY ANGIOPLASTY WITH STENT PLACEMENT      STENTS X3   • HYSTERECTOMY     • MITRAL VALVE REPAIR/REPLACEMENT      repair with MITRACLIP    ,   Family History   Problem Relation Age of Onset   • Heart attack Father    • COPD Other    • Diabetes Other    • Heart disease Other    ,   Social History     Tobacco Use   • Smoking status: Never Smoker   • Smokeless tobacco: Never Used   Substance Use Topics   • Alcohol use: No   • Drug use: No   ,     Medications  Current Outpatient Medications   Medication Sig Dispense Refill   • acetaminophen (TYLENOL) 325 MG tablet Take 325 mg by mouth Every 6 (Six) Hours As Needed for Mild Pain .     • benzonatate (Tessalon Perles) 100 MG capsule Take 1 capsule by mouth 3 (Three) Times a Day As Needed for Cough. 90 capsule 3   • bethanechol (URECHOLINE) 25 MG tablet Take 25 mg by mouth 2 (Two) Times a Day.     • Calcium Carbonate-Vitamin D (CALCIUM 500 + D PO) Take 1 tablet by mouth Daily.     • carvedilol (COREG) 3.125 MG tablet Take 3.125 mg by mouth 2 (Two) Times a Day With Meals.     • clopidogrel (PLAVIX) 75 MG tablet Take 75 mg by mouth Daily.     • furosemide (LASIX) 20 MG tablet Take 20 mg by mouth 2 (Two) Times a Day.     • gabapentin (NEURONTIN) 400 MG capsule Take 400 mg by mouth Every Night.     • ibandronate (BONIVA) 150 MG tablet Take 150 mg by mouth Every 30 (Thirty) Days.     • isosorbide mononitrate (IMDUR) 30 MG 24 hr tablet Take 30 mg by mouth Daily.     • nitroglycerin (NITROSTAT) 0.4 MG SL tablet 1 under the tongue as needed for angina, may repeat q5mins for up three doses 100 tablet 11   • O2 (OXYGEN) Inhale 2 L/min 1 (One) Time.     • potassium chloride (K-DUR,KLOR-CON) 20 MEQ CR tablet Take 20 mEq by mouth Daily.     • pravastatin (PRAVACHOL) 10 MG tablet Take 10 mg by mouth Daily.     • sodium bicarbonate 650 MG tablet Take 650 mg by mouth  "2 (Two) Times a Day.     • sucralfate (CARAFATE) 1 G tablet Take 1 g by mouth 4 (Four) Times a Day.     • traZODone (DESYREL) 100 MG tablet Take 100 mg by mouth Every Night.       No current facility-administered medications for this visit.       Allergies:  Aspirin, Codeine, Contrast dye, Erythromycin base, Iodine, Lisinopril, and Penicillins    Review of Systems  Review of Systems   Constitutional: Positive for malaise/fatigue.   HENT: Negative.    Eyes: Negative.    Cardiovascular: Positive for dyspnea on exertion. Negative for chest pain, claudication, cyanosis, irregular heartbeat, leg swelling, near-syncope, orthopnea, palpitations, paroxysmal nocturnal dyspnea and syncope.   Respiratory: Positive for cough and shortness of breath.    Endocrine: Negative.    Hematologic/Lymphatic: Negative.    Skin: Negative.    Musculoskeletal: Positive for arthritis and back pain.   Gastrointestinal: Negative for anorexia.   Genitourinary: Negative.    Neurological: Positive for weakness.   Psychiatric/Behavioral: Negative.        Objective     Physical Exam:  /70   Pulse 67   Ht 157.5 cm (62\")   Wt 55.3 kg (122 lb)   SpO2 97%   BMI 22.31 kg/m²         Results Review:      Nita Mari  HOLTER MONITOR >7 DAYS UP TO 15 DAYS INTERP ONLY (CLINIC)  Order# [id number]  Reading physician: Angel Fajardo MD Ordering physician: Angel Fajardo MD Study date: 3/19/21   Patient Information    Patient Name   Nita Mari MRN   [id number] Legal Sex   Female  (Age)   10/20/1928 (89 y.o.)   Interpretation Summary    ·  Entire report was reviewed.  Monitoring in days: ~14   · The predominant rhythm noted during the testing period was sinus rhythm.     · Frequent supraventricular ectopics with an APC burden of: 8.2 %    · Rare premature ventricular contractions with a PVC burden of: < 0.1%     · No correlated arrhythmia  · No significant pauses                  Conclusion:      Baseline rhythm is sinus.  Frequent " premature atrial contractions with a PAC burden of 8.2%  847 runs          Results for orders placed during the hospital encounter of 02/05/20    Adult Transthoracic Echo Complete W/ Cont if Necessary Per Protocol    Interpretation Summary  · Estimated EF = 65%.  · Left ventricular systolic function is normal.  · Left ventricular diastolic dysfunction.  · Mild to moderate tricuspid valve regurgitation is present.  · Mild mitral valve regurgitation is present  · Moderate pulmonary hypertension is present.       2014 Dr Broadbent    HEMODYNAMICS:  1.  LV pressure 130/16.   2.  No gradient across the aortic valve.   3.  Left ventriculography ejection fraction 60%.  No identifiable wall motion  abnormality.      IMPRESSION:  1.  Widely patent.  (Previously placed stents in both the proximal and  midportion of the left anterior descending).  2.  Mild to moderate diffuse coronary plaque, at this point left to be treated  medically.   3.  Preserved left ventricular systolic function.   4.  No significant aortic valve stenosis or mitral valve regurgitation.       myocardial perfusion scan   3/4/19        · Myocardial perfusion imaging indicates a medium-sized infarct located in the anterior wall and lateral wall with no significant ischemia noted.  · Left ventricular ejection fraction is hyperdynamic (Calculated EF > 70%).  · Breast attenuation artifact is present.            ECG 12 Lead    Date/Time: 7/2/2021 12:19 PM  Performed by: Angel Fajardo MD  Authorized by: Angel Fajardo MD   Comparison: compared with previous ECG from 8/27/2019  Comparison to previous ECG: Nonspecific STT changes not seen  Rhythm: sinus bradycardia  Rate: bradycardic            Assessment/Plan   Diagnoses and all orders for this visit:    1. Diffuse interstitial pulmonary fibrosis (CMS/HCC) (Primary)  -     Oxygen Therapy    2. Chronic diastolic CHF (congestive heart failure) (CMS/HCC)    3. Diastolic dysfunction    4. Mixed hyperlipidemia    5.  Essential hypertension    6. Dyspnea, unspecified type    Other orders  -     ECG 12 Lead         Plan           Orders Placed This Encounter   Procedures   • Oxygen Therapy     Order Specific Question:   Delivery Modality     Answer:   Nasal Cannula     Order Specific Question:   Liters Per Minute:     Answer:   2     Order Specific Question:   Duration:     Answer:   Continuous     Order Specific Question:   Equipment     Answer:    Oxygen Concentrator &  &  Portable Gaseous Oxygen System & Portable Oxygen Contents Gaseous &  Conserving Regulator     Order Specific Question:   Length of Need (99 Months = Lifetime)     Answer:   99 Months = Lifetime   • ECG 12 Lead     This order was created via procedure documentation     Order Specific Question:   Release to patient     Answer:   Immediate      Recommend oxygen therapy as as resting oxygen saturation is 97% . Desaturation with light exertion to 77%.   99% with 2 LPM supplemental oxygen in recovery on exertion.    Patient expressed understanding  Encouraged and answered all questions   Discussed with the patient and all questioned fully answered. She will call me if any problems arise.   Discussed results of prior testing with patient : echo and 14-day outpatient cardiac telemetry     Monitor for any signs of bleeding  Follow up with AUSTIN Cornejo or any mid level provider working with me to address interim issues               Return in about 3 months (around 10/2/2021) for Video visit.

## 2021-07-20 ENCOUNTER — TELEPHONE (OUTPATIENT)
Dept: CARDIOLOGY | Facility: CLINIC | Age: 86
End: 2021-07-20

## 2021-08-10 ENCOUNTER — TELEPHONE (OUTPATIENT)
Dept: CARDIOLOGY | Facility: CLINIC | Age: 86
End: 2021-08-10

## 2021-08-10 NOTE — TELEPHONE ENCOUNTER
This pt switching provider to Dr. Jessica in [place]. She wants all her records sent to him. She said her son will be around [facility] today and will come by to sign medical release. I advise her to tell him to go at Dr Fajardo office. She voiced understanding.

## 2024-04-17 NOTE — TELEPHONE ENCOUNTER
DISPLAY PLAN FREE TEXT Patient's son called the office today upset and stating the patient was supposed to be prescribed portable oxygen and they have not heard anything regarding this.  He would like to speak with someone today regarding this.  He can be reached at [phone].  Thank you!